# Patient Record
Sex: FEMALE
[De-identification: names, ages, dates, MRNs, and addresses within clinical notes are randomized per-mention and may not be internally consistent; named-entity substitution may affect disease eponyms.]

---

## 2020-04-27 PROBLEM — Z00.00 ENCOUNTER FOR PREVENTIVE HEALTH EXAMINATION: Status: ACTIVE | Noted: 2020-04-27

## 2020-12-17 ENCOUNTER — TRANSCRIPTION ENCOUNTER (OUTPATIENT)
Age: 70
End: 2020-12-17

## 2021-07-12 ENCOUNTER — TRANSCRIPTION ENCOUNTER (OUTPATIENT)
Age: 71
End: 2021-07-12

## 2023-01-12 ENCOUNTER — OFFICE (OUTPATIENT)
Dept: URBAN - METROPOLITAN AREA CLINIC 38 | Facility: CLINIC | Age: 73
Setting detail: OPHTHALMOLOGY
End: 2023-01-12
Payer: MEDICARE

## 2023-01-12 ENCOUNTER — OFFICE (OUTPATIENT)
Dept: URBAN - METROPOLITAN AREA CLINIC 38 | Facility: CLINIC | Age: 73
Setting detail: OPHTHALMOLOGY
End: 2023-01-12

## 2023-01-12 DIAGNOSIS — E11.9: ICD-10-CM

## 2023-01-12 DIAGNOSIS — H33.313: ICD-10-CM

## 2023-01-12 DIAGNOSIS — H43.811: ICD-10-CM

## 2023-01-12 DIAGNOSIS — H90.3: ICD-10-CM

## 2023-01-12 DIAGNOSIS — H02.403: ICD-10-CM

## 2023-01-12 DIAGNOSIS — Z96.1: ICD-10-CM

## 2023-01-12 DIAGNOSIS — H16.223: ICD-10-CM

## 2023-01-12 PROCEDURE — 99213 OFFICE O/P EST LOW 20 MIN: CPT | Performed by: OPHTHALMOLOGY

## 2023-01-12 PROCEDURE — 92551 PURE TONE HEARING TEST AIR: CPT | Performed by: AUDIOLOGIST

## 2023-01-12 ASSESSMENT — REFRACTION_CURRENTRX
OS_SPHERE: +2.00
OS_VPRISM_DIRECTION: SV
OS_CYLINDER: SPH
OS_OVR_VA: 20/
OD_CYLINDER: SPH
OD_VPRISM_DIRECTION: SV
OD_OVR_VA: 20/
OD_SPHERE: +2.00

## 2023-01-12 ASSESSMENT — REFRACTION_AUTOREFRACTION
OD_AXIS: 060
OS_SPHERE: -0.50
OD_SPHERE: -1.00
OS_CYLINDER: -1.50
OS_AXIS: 118
OD_CYLINDER: -0.75

## 2023-01-12 ASSESSMENT — REFRACTION_MANIFEST
OS_AXIS: 050
OU_VA: 20/25
OS_VA1: 20/25
OS_SPHERE: -0.75
OD_SPHERE: -1.50
OD_CYLINDER: -0.50
OD_AXIS: 130
OD_VA1: 20/25-1

## 2023-01-12 ASSESSMENT — KERATOMETRY
OD_K2POWER_DIOPTERS: 46.00
OD_AXISANGLE_DEGREES: 128
METHOD_AUTO_MANUAL: AUTO
OD_K1POWER_DIOPTERS: 45.50
OS_K1POWER_DIOPTERS: 45.50
OS_K2POWER_DIOPTERS: 46.50
OS_AXISANGLE_DEGREES: 054

## 2023-01-12 ASSESSMENT — SPHEQUIV_DERIVED
OD_SPHEQUIV: -1.375
OS_SPHEQUIV: -1.25
OD_SPHEQUIV: -1.75

## 2023-01-12 ASSESSMENT — VISUAL ACUITY
OS_BCVA: 20/30-2
OD_BCVA: 20/20

## 2023-01-12 ASSESSMENT — SUPERFICIAL PUNCTATE KERATITIS (SPK)
OD_SPK: T
OS_SPK: T

## 2023-01-12 ASSESSMENT — TONOMETRY
OD_IOP_MMHG: 13
OS_IOP_MMHG: 13

## 2023-01-12 ASSESSMENT — AXIALLENGTH_DERIVED
OS_AL: 23.1684
OD_AL: 23.3047
OD_AL: 23.4481

## 2023-01-12 ASSESSMENT — CONFRONTATIONAL VISUAL FIELD TEST (CVF)
OS_FINDINGS: FULL
OD_FINDINGS: FULL

## 2024-01-18 ENCOUNTER — OFFICE (OUTPATIENT)
Dept: URBAN - METROPOLITAN AREA CLINIC 38 | Facility: CLINIC | Age: 74
Setting detail: OPHTHALMOLOGY
End: 2024-01-18
Payer: MEDICARE

## 2024-01-18 DIAGNOSIS — E11.9: ICD-10-CM

## 2024-01-18 DIAGNOSIS — H02.403: ICD-10-CM

## 2024-01-18 DIAGNOSIS — H33.313: ICD-10-CM

## 2024-01-18 DIAGNOSIS — Z96.1: ICD-10-CM

## 2024-01-18 DIAGNOSIS — H90.3: ICD-10-CM

## 2024-01-18 DIAGNOSIS — H16.223: ICD-10-CM

## 2024-01-18 DIAGNOSIS — H43.811: ICD-10-CM

## 2024-01-18 DIAGNOSIS — H52.4: ICD-10-CM

## 2024-01-18 DIAGNOSIS — H83.3X3: ICD-10-CM

## 2024-01-18 PROCEDURE — 92567 TYMPANOMETRY: CPT | Mod: AB | Performed by: AUDIOLOGIST

## 2024-01-18 PROCEDURE — 92015 DETERMINE REFRACTIVE STATE: CPT | Performed by: OPHTHALMOLOGY

## 2024-01-18 PROCEDURE — 99213 OFFICE O/P EST LOW 20 MIN: CPT | Performed by: OPHTHALMOLOGY

## 2024-01-18 PROCEDURE — 92557 COMPREHENSIVE HEARING TEST: CPT | Mod: AB | Performed by: AUDIOLOGIST

## 2024-01-18 ASSESSMENT — REFRACTION_MANIFEST
OD_VA2: 20/20(J1+)
OD_SPHERE: -1.00
OS_AXIS: 125
OS_ADD: +2.50
OD_CYLINDER: -0.75
OD_VA1: 20/30
OS_VA1: 20/25-
OS_VA2: 20/20(J1+)
OU_VA: 20/25-2
OS_ADD: +2.50
OD_ADD: +2.50
OD_AXIS: 060
OS_CYLINDER: -1.00
OD_SPHERE: -1.25
OD_CYLINDER: -0.75
OS_VA1: 20/25-
OD_AXIS: 060
OD_ADD: +2.50
OS_AXIS: 125
OD_VA1: 20/30
OS_SPHERE: -0.75
OS_VA2: 20/20(J1+)
OS_SPHERE: -0.50
OS_CYLINDER: -1.00
OD_VA2: 20/20(J1+)
OU_VA: 20/25-2

## 2024-01-18 ASSESSMENT — SPHEQUIV_DERIVED
OD_SPHEQUIV: -1.375
OS_SPHEQUIV: -1
OS_SPHEQUIV: -1.25
OS_SPHEQUIV: -1.375
OD_SPHEQUIV: -1.625
OD_SPHEQUIV: -1.375

## 2024-01-18 ASSESSMENT — SUPERFICIAL PUNCTATE KERATITIS (SPK)
OD_SPK: 1+
OS_SPK: 1+

## 2024-01-18 ASSESSMENT — CONFRONTATIONAL VISUAL FIELD TEST (CVF)
OD_FINDINGS: FULL
OS_FINDINGS: FULL

## 2024-01-18 ASSESSMENT — REFRACTION_AUTOREFRACTION
OS_SPHERE: -0.75
OD_CYLINDER: -0.75
OD_AXIS: 061
OD_SPHERE: -1.00
OS_CYLINDER: -1.25
OS_AXIS: 125

## 2024-01-18 ASSESSMENT — REFRACTION_CURRENTRX
OS_SPHERE: +2.00
OD_OVR_VA: 20/
OD_SPHERE: +2.00
OS_CYLINDER: SPH
OD_CYLINDER: SPH
OD_VPRISM_DIRECTION: SV
OS_OVR_VA: 20/
OS_VPRISM_DIRECTION: SV

## 2024-01-18 ASSESSMENT — TEAR BREAK UP TIME (TBUT)
OS_TBUT: 6-8 SECS
OD_TBUT: 6-8 SECS

## 2024-10-14 ENCOUNTER — APPOINTMENT (OUTPATIENT)
Dept: ORTHOPEDIC SURGERY | Facility: CLINIC | Age: 74
End: 2024-10-14
Payer: MEDICARE

## 2024-10-14 DIAGNOSIS — Z87.81 PERSONAL HISTORY OF (HEALED) TRAUMATIC FRACTURE: ICD-10-CM

## 2024-10-14 DIAGNOSIS — Z79.01 LONG TERM (CURRENT) USE OF ANTICOAGULANTS: ICD-10-CM

## 2024-10-14 DIAGNOSIS — M17.12 UNILATERAL PRIMARY OSTEOARTHRITIS, LEFT KNEE: ICD-10-CM

## 2024-10-14 DIAGNOSIS — Z86.718 PERSONAL HISTORY OF OTHER VENOUS THROMBOSIS AND EMBOLISM: ICD-10-CM

## 2024-10-14 DIAGNOSIS — M24.662 ANKYLOSIS, LEFT KNEE: ICD-10-CM

## 2024-10-14 DIAGNOSIS — T84.84XA PAIN DUE TO INTERNAL ORTHOPEDIC PROSTHETIC DEVICES, IMPLANTS AND GRAFTS, INITIAL ENCOUNTER: ICD-10-CM

## 2024-10-14 PROCEDURE — 99205 OFFICE O/P NEW HI 60 MIN: CPT

## 2024-10-14 PROCEDURE — G2211 COMPLEX E/M VISIT ADD ON: CPT

## 2024-10-14 PROCEDURE — 73564 X-RAY EXAM KNEE 4 OR MORE: CPT | Mod: LT

## 2024-10-14 PROCEDURE — 73552 X-RAY EXAM OF FEMUR 2/>: CPT | Mod: LT

## 2024-11-22 RX ORDER — NAPROXEN 500 MG/1
500 TABLET ORAL
Qty: 60 | Refills: 0 | Status: ACTIVE | COMMUNITY
Start: 2024-11-22 | End: 1900-01-01

## 2024-12-23 ENCOUNTER — RX RENEWAL (OUTPATIENT)
Age: 74
End: 2024-12-23

## 2024-12-30 ENCOUNTER — RX RENEWAL (OUTPATIENT)
Age: 74
End: 2024-12-30

## 2025-02-03 ENCOUNTER — OFFICE (OUTPATIENT)
Dept: URBAN - METROPOLITAN AREA CLINIC 113 | Facility: CLINIC | Age: 75
Setting detail: OPHTHALMOLOGY
End: 2025-02-03
Payer: MEDICARE

## 2025-02-03 DIAGNOSIS — H16.223: ICD-10-CM

## 2025-02-03 DIAGNOSIS — H33.313: ICD-10-CM

## 2025-02-03 DIAGNOSIS — H40.013: ICD-10-CM

## 2025-02-03 DIAGNOSIS — H43.811: ICD-10-CM

## 2025-02-03 DIAGNOSIS — E11.9: ICD-10-CM

## 2025-02-03 DIAGNOSIS — H52.7: ICD-10-CM

## 2025-02-03 DIAGNOSIS — H02.403: ICD-10-CM

## 2025-02-03 PROCEDURE — 92250 FUNDUS PHOTOGRAPHY W/I&R: CPT | Performed by: STUDENT IN AN ORGANIZED HEALTH CARE EDUCATION/TRAINING PROGRAM

## 2025-02-03 PROCEDURE — 92014 COMPRE OPH EXAM EST PT 1/>: CPT | Performed by: STUDENT IN AN ORGANIZED HEALTH CARE EDUCATION/TRAINING PROGRAM

## 2025-02-03 PROCEDURE — 92015 DETERMINE REFRACTIVE STATE: CPT | Performed by: STUDENT IN AN ORGANIZED HEALTH CARE EDUCATION/TRAINING PROGRAM

## 2025-02-03 ASSESSMENT — KERATOMETRY
OD_K2POWER_DIOPTERS: 45.75
METHOD_AUTO_MANUAL: AUTO
OD_AXISANGLE_DEGREES: 138
OS_K1POWER_DIOPTERS: 45.25
OS_K2POWER_DIOPTERS: 46.00
OD_K1POWER_DIOPTERS: 45.50
OS_AXISANGLE_DEGREES: 050

## 2025-02-03 ASSESSMENT — REFRACTION_MANIFEST
OU_VA: 20/25-2
OD_VA1: 20/30
OS_VA2: 20/20(J1+)
OS_VA1: 20/25-
OS_CYLINDER: -1.00
OD_VA1: 20/30
OS_ADD: +2.50
OD_SPHERE: -1.00
OD_ADD: +2.50
OS_VA1: 20/25-
OS_SPHERE: -0.50
OS_CYLINDER: -1.00
OD_AXIS: 060
OS_SPHERE: -0.75
OS_AXIS: 125
OD_CYLINDER: -0.75
OS_ADD: +2.50
OS_VA2: 20/20(J1+)
OD_SPHERE: -1.25
OD_ADD: +2.50
OD_VA2: 20/20(J1+)
OU_VA: 20/25-2
OS_AXIS: 125
OD_VA2: 20/20(J1+)
OD_CYLINDER: -0.75
OD_AXIS: 060

## 2025-02-03 ASSESSMENT — REFRACTION_CURRENTRX
OS_OVR_VA: 20/
OS_SPHERE: +2.00
OD_VPRISM_DIRECTION: SV
OD_SPHERE: +2.00
OS_VPRISM_DIRECTION: SV
OD_OVR_VA: 20/
OS_CYLINDER: SPH
OD_CYLINDER: SPH

## 2025-02-03 ASSESSMENT — SUPERFICIAL PUNCTATE KERATITIS (SPK)
OD_SPK: 1+
OS_SPK: 1+

## 2025-02-03 ASSESSMENT — TEAR BREAK UP TIME (TBUT)
OS_TBUT: 6-8 SECS
OD_TBUT: 6-8 SECS

## 2025-02-03 ASSESSMENT — TONOMETRY
OD_IOP_MMHG: 13
OS_IOP_MMHG: 15

## 2025-02-03 ASSESSMENT — REFRACTION_AUTOREFRACTION
OD_AXIS: 075
OD_CYLINDER: -1.00
OS_AXIS: 125
OS_CYLINDER: -1.00
OD_SPHERE: -1.50
OS_SPHERE: -1.00

## 2025-02-03 ASSESSMENT — CONFRONTATIONAL VISUAL FIELD TEST (CVF)
OS_FINDINGS: FULL
OD_FINDINGS: FULL

## 2025-02-03 ASSESSMENT — VISUAL ACUITY
OS_BCVA: 20/80
OD_BCVA: 20/20-2

## 2025-05-28 ENCOUNTER — OUTPATIENT (OUTPATIENT)
Dept: OUTPATIENT SERVICES | Facility: HOSPITAL | Age: 75
LOS: 1 days | End: 2025-05-28
Payer: MEDICARE

## 2025-05-28 VITALS
OXYGEN SATURATION: 98 % | TEMPERATURE: 97 F | RESPIRATION RATE: 18 BRPM | WEIGHT: 152.12 LBS | DIASTOLIC BLOOD PRESSURE: 70 MMHG | HEART RATE: 67 BPM | SYSTOLIC BLOOD PRESSURE: 114 MMHG | HEIGHT: 60 IN

## 2025-05-28 DIAGNOSIS — Z29.9 ENCOUNTER FOR PROPHYLACTIC MEASURES, UNSPECIFIED: ICD-10-CM

## 2025-05-28 DIAGNOSIS — E10.9 TYPE 1 DIABETES MELLITUS WITHOUT COMPLICATIONS: ICD-10-CM

## 2025-05-28 DIAGNOSIS — Z98.890 OTHER SPECIFIED POSTPROCEDURAL STATES: Chronic | ICD-10-CM

## 2025-05-28 DIAGNOSIS — Z01.818 ENCOUNTER FOR OTHER PREPROCEDURAL EXAMINATION: ICD-10-CM

## 2025-05-28 DIAGNOSIS — M17.12 UNILATERAL PRIMARY OSTEOARTHRITIS, LEFT KNEE: ICD-10-CM

## 2025-05-28 DIAGNOSIS — Z98.49 CATARACT EXTRACTION STATUS, UNSPECIFIED EYE: Chronic | ICD-10-CM

## 2025-05-28 DIAGNOSIS — Z13.89 ENCOUNTER FOR SCREENING FOR OTHER DISORDER: ICD-10-CM

## 2025-05-28 LAB
A1C WITH ESTIMATED AVERAGE GLUCOSE RESULT: 7.6 % — HIGH (ref 4–5.6)
ALBUMIN SERPL ELPH-MCNC: 4.2 G/DL — SIGNIFICANT CHANGE UP (ref 3.3–5.2)
ALP SERPL-CCNC: 93 U/L — SIGNIFICANT CHANGE UP (ref 40–120)
ALT FLD-CCNC: 12 U/L — SIGNIFICANT CHANGE UP
ANION GAP SERPL CALC-SCNC: 14 MMOL/L — SIGNIFICANT CHANGE UP (ref 5–17)
APTT BLD: 30.4 SEC — SIGNIFICANT CHANGE UP (ref 26.1–36.8)
AST SERPL-CCNC: 20 U/L — SIGNIFICANT CHANGE UP
BASOPHILS # BLD AUTO: 0.02 K/UL — SIGNIFICANT CHANGE UP (ref 0–0.2)
BASOPHILS NFR BLD AUTO: 0.3 % — SIGNIFICANT CHANGE UP (ref 0–2)
BILIRUB SERPL-MCNC: 0.3 MG/DL — LOW (ref 0.4–2)
BLD GP AB SCN SERPL QL: SIGNIFICANT CHANGE UP
BUN SERPL-MCNC: 23.3 MG/DL — HIGH (ref 8–20)
CALCIUM SERPL-MCNC: 9.2 MG/DL — SIGNIFICANT CHANGE UP (ref 8.4–10.5)
CHLORIDE SERPL-SCNC: 103 MMOL/L — SIGNIFICANT CHANGE UP (ref 96–108)
CO2 SERPL-SCNC: 24 MMOL/L — SIGNIFICANT CHANGE UP (ref 22–29)
CREAT SERPL-MCNC: 0.45 MG/DL — LOW (ref 0.5–1.3)
EGFR: 101 ML/MIN/1.73M2 — SIGNIFICANT CHANGE UP
EGFR: 101 ML/MIN/1.73M2 — SIGNIFICANT CHANGE UP
EOSINOPHIL # BLD AUTO: 0.23 K/UL — SIGNIFICANT CHANGE UP (ref 0–0.5)
EOSINOPHIL NFR BLD AUTO: 3.8 % — SIGNIFICANT CHANGE UP (ref 0–6)
ESTIMATED AVERAGE GLUCOSE: 171 MG/DL — HIGH (ref 68–114)
GLUCOSE SERPL-MCNC: 102 MG/DL — HIGH (ref 70–99)
HCT VFR BLD CALC: 39.3 % — SIGNIFICANT CHANGE UP (ref 34.5–45)
HGB BLD-MCNC: 12.8 G/DL — SIGNIFICANT CHANGE UP (ref 11.5–15.5)
IMM GRANULOCYTES # BLD AUTO: 0.01 K/UL — SIGNIFICANT CHANGE UP (ref 0–0.07)
IMM GRANULOCYTES NFR BLD AUTO: 0.2 % — SIGNIFICANT CHANGE UP (ref 0–0.9)
INR BLD: 0.94 RATIO — SIGNIFICANT CHANGE UP (ref 0.85–1.16)
LYMPHOCYTES # BLD AUTO: 1.34 K/UL — SIGNIFICANT CHANGE UP (ref 1–3.3)
LYMPHOCYTES NFR BLD AUTO: 22.4 % — SIGNIFICANT CHANGE UP (ref 13–44)
MCHC RBC-ENTMCNC: 31.1 PG — SIGNIFICANT CHANGE UP (ref 27–34)
MCHC RBC-ENTMCNC: 32.6 G/DL — SIGNIFICANT CHANGE UP (ref 32–36)
MCV RBC AUTO: 95.4 FL — SIGNIFICANT CHANGE UP (ref 80–100)
MONOCYTES # BLD AUTO: 0.59 K/UL — SIGNIFICANT CHANGE UP (ref 0–0.9)
MONOCYTES NFR BLD AUTO: 9.8 % — SIGNIFICANT CHANGE UP (ref 2–14)
MRSA PCR RESULT.: SIGNIFICANT CHANGE UP
NEUTROPHILS # BLD AUTO: 3.8 K/UL — SIGNIFICANT CHANGE UP (ref 1.8–7.4)
NEUTROPHILS NFR BLD AUTO: 63.5 % — SIGNIFICANT CHANGE UP (ref 43–77)
NRBC # BLD AUTO: 0 K/UL — SIGNIFICANT CHANGE UP (ref 0–0)
NRBC # FLD: 0 K/UL — SIGNIFICANT CHANGE UP (ref 0–0)
NRBC BLD AUTO-RTO: 0 /100 WBCS — SIGNIFICANT CHANGE UP (ref 0–0)
PLATELET # BLD AUTO: 262 K/UL — SIGNIFICANT CHANGE UP (ref 150–400)
PMV BLD: 9.7 FL — SIGNIFICANT CHANGE UP (ref 7–13)
POTASSIUM SERPL-MCNC: 4.2 MMOL/L — SIGNIFICANT CHANGE UP (ref 3.5–5.3)
POTASSIUM SERPL-SCNC: 4.2 MMOL/L — SIGNIFICANT CHANGE UP (ref 3.5–5.3)
PROT SERPL-MCNC: 6.6 G/DL — SIGNIFICANT CHANGE UP (ref 6.6–8.7)
PROTHROM AB SERPL-ACNC: 10.6 SEC — SIGNIFICANT CHANGE UP (ref 9.9–13.4)
RBC # BLD: 4.12 M/UL — SIGNIFICANT CHANGE UP (ref 3.8–5.2)
RBC # FLD: 12.7 % — SIGNIFICANT CHANGE UP (ref 10.3–14.5)
S AUREUS DNA NOSE QL NAA+PROBE: SIGNIFICANT CHANGE UP
SODIUM SERPL-SCNC: 141 MMOL/L — SIGNIFICANT CHANGE UP (ref 135–145)
WBC # BLD: 5.99 K/UL — SIGNIFICANT CHANGE UP (ref 3.8–10.5)
WBC # FLD AUTO: 5.99 K/UL — SIGNIFICANT CHANGE UP (ref 3.8–10.5)

## 2025-05-28 PROCEDURE — 85025 COMPLETE CBC W/AUTO DIFF WBC: CPT

## 2025-05-28 PROCEDURE — 83036 HEMOGLOBIN GLYCOSYLATED A1C: CPT

## 2025-05-28 PROCEDURE — 86901 BLOOD TYPING SEROLOGIC RH(D): CPT

## 2025-05-28 PROCEDURE — G0463: CPT

## 2025-05-28 PROCEDURE — 85610 PROTHROMBIN TIME: CPT

## 2025-05-28 PROCEDURE — 86850 RBC ANTIBODY SCREEN: CPT

## 2025-05-28 PROCEDURE — 87641 MR-STAPH DNA AMP PROBE: CPT

## 2025-05-28 PROCEDURE — 36415 COLL VENOUS BLD VENIPUNCTURE: CPT

## 2025-05-28 PROCEDURE — 80053 COMPREHEN METABOLIC PANEL: CPT

## 2025-05-28 PROCEDURE — 87640 STAPH A DNA AMP PROBE: CPT

## 2025-05-28 PROCEDURE — 86900 BLOOD TYPING SEROLOGIC ABO: CPT

## 2025-05-28 PROCEDURE — 85730 THROMBOPLASTIN TIME PARTIAL: CPT

## 2025-05-28 NOTE — H&P PST ADULT - MUSCULOSKELETAL
details… no joint swelling/no joint erythema/no joint warmth/no calf tenderness/decreased ROM due to pain/strength 5/5 bilateral upper extremities

## 2025-05-28 NOTE — H&P PST ADULT - HISTORY OF PRESENT ILLNESS
Mariposa is a 74-year-old female that presents today for initial evaluation of left knee pain which is chronic in nature. She is status post multiple surgeries for the left knee dating back to 1972. Have been diagnosed with advanced osteoarthritis, previously indicated for left total knee arthroplasty. She states that she was trying to put this off for as long as possible. Unfortunately, she sustained a fall on 3/5/2024 which did result in both a distal femur shaft and patella fracture which both required ORIF and IM nail placement.  She presents today with a chief complaint of constant, moderate, diffuse dull ache and pain about the knee. Exacerbated with standing, walking and associated with significant stiffness with regard to deep knee bending. She denies any mechanical symptoms or instability. She denies any groin pain.  Treatment has consisted of Tylenol, activity modification, several months of physician directed home exercise program, physical therapy, as well as previous corticosteroid injections, hyaluronic acid injections and activity modification, several months of physician directed home exercise program, physical therapy, as well as previous corticosteroid injections, hyaluronic acid injections and PRP injections. The last injection of any sort was 2 years ago.  Patient reports a history of previous DVT in the left lower extremity following her surgery in March of this year. She is currently taking Eliquis. She does also have a history of insulin-dependent diabetes. 74 yr F PMH Hypothyroidism, HLD,  IDDM ( dexcom pump), DVT(s/p left knee surgery 2024, off eliquis, on aspirin), anemia, GERD, skin ca s/p excision seen in PST today for evaluation. Patient complain of pain and decreased ROM in left knee. Stated she had a fall in 2024 with patella fracture, had ORIF and nail placement. Patient states she has had multiple surgeries to the left knee. The pain has gotten worse, pain is 7/10 with movement, she takes tylenol prn but relief not sustained. Patient is scheduled for complex left knee total replacement on 6/17/25 pending medical optimization.     74 yr F PMH Hypothyroidism, HLD, DM Type 1, IDDM ( dexcom pump), DVT(s/p left knee surgery 2024, off eliquis, on aspirin), anemia, GERD, skin ca s/p excision seen in PST today for evaluation. Patient complain of pain and decreased ROM in left knee. Stated she had a fall in 2024 with patella fracture, had ORIF and nail placement. Patient states she has had multiple surgeries to the left knee. The pain has gotten worse, pain is 7/10 with movement, she takes tylenol prn but relief not sustained. Patient is scheduled for complex left knee total replacement on 6/17/25 pending medical optimization.     74 yr F PMH Hypothyroidism, HLD, DM Type 1, IDDM ( dexcom pump), DVT(s/p left knee surgery 2024, off eliquis, on aspirin), anemia, GERD, skin ca s/p excision seen in PST today for evaluation. Patient complain of pain and decreased ROM in left knee. Stated she had a fall in 2024 with patella fracture, had ORIF and nail placement. Patient states she has had multiple surgeries to the left knee. The pain has gotten worse, pain is 7/10 with movement, she takes tylenol prn but relief not sustained. Patient is scheduled for complex left knee total replacement on 6/17/25 pending medical and cardiac optimizations

## 2025-05-28 NOTE — H&P PST ADULT - NSICDXPASTSURGICALHX_GEN_ALL_CORE_FT
PAST SURGICAL HISTORY:  H/O knee surgery     History of carpal tunnel surgery     S/P cataract surgery     S/P decompression of ulnar nerve

## 2025-05-28 NOTE — H&P PST ADULT - PROBLEM SELECTOR PLAN 2
Novolog, dexcom pump, f/u with endocrinology Patient is scheduled for complex left knee total replacement on 6/17/25 pending medical and cardiac optimizations

## 2025-05-28 NOTE — H&P PST ADULT - PROBLEM SELECTOR PLAN 1
Patient is scheduled for complex left knee total replacement on 6/17/25 pending medical optimization. preop assessment, 12 lead EKG reviewed, cardiac optimization pending

## 2025-05-28 NOTE — H&P PST ADULT - NSICDXFAMILYHX_GEN_ALL_CORE_FT
FAMILY HISTORY:  Father  Still living? Unknown  FHx: lung cancer, Age at diagnosis: Age Unknown    Mother  Still living? Unknown  FH: HTN (hypertension), Age at diagnosis: Age Unknown  FH: skin cancer, Age at diagnosis: Age Unknown  FH: type 2 diabetes, Age at diagnosis: Age Unknown  FHx: lung cancer, Age at diagnosis: Age Unknown    Sibling  Still living? Unknown  FH: prostate cancer, Age at diagnosis: Age Unknown

## 2025-05-28 NOTE — H&P PST ADULT - PROBLEM SELECTOR PLAN 3
caprini score 8, high risk for dvt, SCD ordered, surgical team to assess for dvt prophylaxis Novolog, dexcom pump, f/u with endocrinology

## 2025-05-28 NOTE — H&P PST ADULT - NSICDXPASTMEDICALHX_GEN_ALL_CORE_FT
PAST MEDICAL HISTORY:  T1DM (type 1 diabetes mellitus)      PAST MEDICAL HISTORY:  Anemia     GERD (gastroesophageal reflux disease)     History of DVT of lower extremity     Hypothyroidism     Osteoarthritis     T1DM (type 1 diabetes mellitus)      PAST MEDICAL HISTORY:  Anemia     GERD (gastroesophageal reflux disease)     History of DVT of lower extremity     History of fractured pelvis     History of stress fracture     Hypothyroidism     Osteoarthritis     T1DM (type 1 diabetes mellitus)      PAST MEDICAL HISTORY:  Abnormal EKG     Anemia     GERD (gastroesophageal reflux disease)     History of DVT of lower extremity     History of fractured pelvis     History of stress fracture     Hypothyroidism     Osteoarthritis     T1DM (type 1 diabetes mellitus)

## 2025-05-28 NOTE — H&P PST ADULT - ASSESSMENT
74 yr F PMH Hypothyroidism, HLD,  IDDM ( dexcom pump), DVT(s/p left knee surgery , off eliquis, on aspirin), anemia, GERD, skin ca s/p excision seen in PST today for evaluation. Patient complain of pain and decreased ROM in left knee. Stated she had a fall in  with patella fracture, had ORIF and nail placement. Patient states she has had multiple surgeries to the left knee. The pain has gotten worse, pain is 7/10 with movement, she takes tylenol prn but relief not sustained. Patient is scheduled for complex left knee total replacement on 25 pending medical optimization.    Patient given written and oral ERP instructions,  verbalized understanding.  Patient's medication list reviewed and dosages/names of meds reconciled with Dr Wilder according to all information available at the time of PST visit.     Patient was educated on preop preparation with written and verbal instructions. Pt was informed to obtain medical optimization >3 days before surgery. Pt will review insulin pump management with endocrinology . Pt was educated on NSAIDs, multivitamins and herbals that increase the risk of bleeding and need to be stopped 7 days before procedure. Pt verbalized understanding of the above.      OPIOID RISK TOOL    CLAUDIA EACH BOX THAT APPLIES AND ADD TOTALS AT THE END    FAMILY HISTORY OF SUBSTANCE ABUSE                 FEMALE         MALE                                                Alcohol                             [  ]1 pt          [  ]3pts                                               Illegal Drugs                     [  ]2 pts        [  ]3pts                                               Rx Drugs                           [  ]4 pts        [  ]4 pts    PERSONAL HISTORY OF SUBSTANCE ABUSE                                                                                          Alcohol                             [  ]3 pts       [  ]3 pts                                               Illegal Drugs                     [  ]4 pts        [  ]4 pts                                               Rx Drugs                           [  ]5 pts        [  ]5 pts    AGE BETWEEN 16-45 YEARS                                      [  ]1 pt         [  ]1 pt    HISTORY OF PREADOLESCENT   SEXUAL ABUSE                                                             [  ]3 pts        [  ]0pts    PSYCHOLOGICAL DISEASE                     ADD, OCD, Bipolar, Schizophrenia        [  ]2 pts         [  ]2 pts                      Depression                                               [  ]1 pt           [  ]1 pt           SCORING TOTAL   (add numbers and type here)              (**0*)                                     A score of 3 or lower indicated LOW risk for future opioid abuse  A score of 4 to 7 indicated moderate risk for future opioid abuse  A score of 8 or higher indicates a high risk for opioid abuse    CAPRINI SCORE    AGE RELATED RISK FACTORS                                                             [ ] Age 41-60 years                                            (1 Point)  [ x] Age: 61-74 years                                           (2 Points)                 [ ] Age= 75 years                                                (3 Points)             DISEASE RELATED RISK FACTORS                                                       [ ] Edema in the lower extremities                 (1 Point)                     [ ] Varicose veins                                               (1 Point)                                 [ x] BMI > 25 Kg/m2                                            (1 Point)                                  [ ] Serious infection (ie PNA)                            (1 Point)                     [ ] Lung disease ( COPD, Emphysema)            (1 Point)                                                                          [ ] Acute myocardial infarction                         (1 Point)                  [ ] Congestive heart failure (in the previous month)  (1 Point)         [ ] Inflammatory bowel disease                            (1 Point)                  [ ] Central venous access, PICC or Port               (2 points)       (within the last month)                                                                [ ] Stroke (in the previous month)                        (5 Points)    [ ] Previous or present malignancy                       (2 points)                                                                                                                                                         HEMATOLOGY RELATED FACTORS                                                         [ ] Prior episodes of VTE                                     (3 Points)                     [ ] Positive family history for VTE                      (3 Points)                  [ ] Prothrombin 80238 A                                     (3 Points)                     [ ] Factor V Leiden                                                (3 Points)                        [ ] Lupus anticoagulants                                      (3 Points)                                                           [ ] Anticardiolipin antibodies                              (3 Points)                                                       [ ] High homocysteine in the blood                   (3 Points)                                             [ ] Other congenital or acquired thrombophilia      (3 Points)                                                [ ] Heparin induced thrombocytopenia                  (3 Points)                                        MOBILITY RELATED FACTORS  [ ] Bed rest                                                         (1 Point)  [ ] Plaster cast                                                    (2 points)  [ ] Bed bound for more than 72 hours           (2 Points)    GENDER SPECIFIC FACTORS  [ ] Pregnancy or had a baby within the last month   (1 Point)  [ ] Post-partum < 6 weeks                                   (1 Point)  [ ] Hormonal therapy  or oral contraception   (1 Point)  [ ] History of pregnancy complications              (1 point)  [ ] Unexplained or recurrent              (1 Point)    OTHER RISK FACTORS                                           (1 Point)  [ ] BMI >40, smoking, diabetes requiring insulin, chemotherapy  blood transfusions and length of surgery over 2 hours    SURGERY RELATED RISK FACTORS  [ ]  Section within the last month     (1 Point)  [ ] Minor surgery                                                  (1 Point)  [ ] Arthroscopic surgery                                       (2 Points)  [ ] Planned major surgery lasting more            (2 Points)      than 45 minutes     [ x] Elective hip or knee joint replacement       (5 points)       surgery                                                TRAUMA RELATED RISK FACTORS  [ ] Fracture of the hip, pelvis, or leg                       (5 Points)  [ ] Spinal cord injury resulting in paralysis             (5 points)       (in the previous month)    [ ] Paralysis  (less than 1 month)                             (5 Points)  [ ] Multiple Trauma within 1 month                        (5 Points)    Total Score [    8    ]    Caprini Score 0-2: Low Risk, NO VTE prophylaxis required for most patients, encourage ambulation  Caprini Score 3-6: Moderate Risk , pharmacologic VTE prophylaxis is indicated for most patients (in the absence of contraindications)  Caprini Score Greater than or =7: High risk, pharmocologic VTE prophylaxis indicated for most patients (in the absence of contraindications)                                     74 yr F PMH Hypothyroidism, HLD, DM Type 1, IDDM ( dexcom pump), DVT(s/p left knee surgery , off eliquis, on aspirin), anemia, GERD, skin ca s/p excision seen in PST today for evaluation. Patient complain of pain and decreased ROM in left knee. Stated she had a fall in  with patella fracture, had ORIF and nail placement. Patient states she has had multiple surgeries to the left knee. The pain has gotten worse, pain is 7/10 with movement, she takes tylenol prn but relief not sustained. Patient is scheduled for complex left knee total replacement on 25 pending medical optimization.    Patient given written and oral ERP instructions,  verbalized understanding.  Patient's medication list reviewed and dosages/names of meds reconciled with Dr Wilder according to all information available at the time of PST visit.     Patient was educated on preop preparation with written and verbal instructions. Pt was informed to obtain medical optimization >3 days before surgery. Pt will review insulin pump management with endocrinology . Pt was educated on NSAIDs, multivitamins and herbals that increase the risk of bleeding and need to be stopped 7 days before procedure. Pt verbalized understanding of the above.      OPIOID RISK TOOL    CLAUDIA EACH BOX THAT APPLIES AND ADD TOTALS AT THE END    FAMILY HISTORY OF SUBSTANCE ABUSE                 FEMALE         MALE                                                Alcohol                             [  ]1 pt          [  ]3pts                                               Illegal Drugs                     [  ]2 pts        [  ]3pts                                               Rx Drugs                           [  ]4 pts        [  ]4 pts    PERSONAL HISTORY OF SUBSTANCE ABUSE                                                                                          Alcohol                             [  ]3 pts       [  ]3 pts                                               Illegal Drugs                     [  ]4 pts        [  ]4 pts                                               Rx Drugs                           [  ]5 pts        [  ]5 pts    AGE BETWEEN 16-45 YEARS                                      [  ]1 pt         [  ]1 pt    HISTORY OF PREADOLESCENT   SEXUAL ABUSE                                                             [  ]3 pts        [  ]0pts    PSYCHOLOGICAL DISEASE                     ADD, OCD, Bipolar, Schizophrenia        [  ]2 pts         [  ]2 pts                      Depression                                               [  ]1 pt           [  ]1 pt           SCORING TOTAL   (add numbers and type here)              (**0*)                                     A score of 3 or lower indicated LOW risk for future opioid abuse  A score of 4 to 7 indicated moderate risk for future opioid abuse  A score of 8 or higher indicates a high risk for opioid abuse    CAPRINI SCORE    AGE RELATED RISK FACTORS                                                             [ ] Age 41-60 years                                            (1 Point)  [ x] Age: 61-74 years                                           (2 Points)                 [ ] Age= 75 years                                                (3 Points)             DISEASE RELATED RISK FACTORS                                                       [ ] Edema in the lower extremities                 (1 Point)                     [ ] Varicose veins                                               (1 Point)                                 [ x] BMI > 25 Kg/m2                                            (1 Point)                                  [ ] Serious infection (ie PNA)                            (1 Point)                     [ ] Lung disease ( COPD, Emphysema)            (1 Point)                                                                          [ ] Acute myocardial infarction                         (1 Point)                  [ ] Congestive heart failure (in the previous month)  (1 Point)         [ ] Inflammatory bowel disease                            (1 Point)                  [ ] Central venous access, PICC or Port               (2 points)       (within the last month)                                                                [ ] Stroke (in the previous month)                        (5 Points)    [ ] Previous or present malignancy                       (2 points)                                                                                                                                                         HEMATOLOGY RELATED FACTORS                                                         [ ] Prior episodes of VTE                                     (3 Points)                     [ ] Positive family history for VTE                      (3 Points)                  [ ] Prothrombin 49957 A                                     (3 Points)                     [ ] Factor V Leiden                                                (3 Points)                        [ ] Lupus anticoagulants                                      (3 Points)                                                           [ ] Anticardiolipin antibodies                              (3 Points)                                                       [ ] High homocysteine in the blood                   (3 Points)                                             [ ] Other congenital or acquired thrombophilia      (3 Points)                                                [ ] Heparin induced thrombocytopenia                  (3 Points)                                        MOBILITY RELATED FACTORS  [ ] Bed rest                                                         (1 Point)  [ ] Plaster cast                                                    (2 points)  [ ] Bed bound for more than 72 hours           (2 Points)    GENDER SPECIFIC FACTORS  [ ] Pregnancy or had a baby within the last month   (1 Point)  [ ] Post-partum < 6 weeks                                   (1 Point)  [ ] Hormonal therapy  or oral contraception   (1 Point)  [ ] History of pregnancy complications              (1 point)  [ ] Unexplained or recurrent              (1 Point)    OTHER RISK FACTORS                                           (1 Point)  [ ] BMI >40, smoking, diabetes requiring insulin, chemotherapy  blood transfusions and length of surgery over 2 hours    SURGERY RELATED RISK FACTORS  [ ]  Section within the last month     (1 Point)  [ ] Minor surgery                                                  (1 Point)  [ ] Arthroscopic surgery                                       (2 Points)  [ ] Planned major surgery lasting more            (2 Points)      than 45 minutes     [ x] Elective hip or knee joint replacement       (5 points)       surgery                                                TRAUMA RELATED RISK FACTORS  [ ] Fracture of the hip, pelvis, or leg                       (5 Points)  [ ] Spinal cord injury resulting in paralysis             (5 points)       (in the previous month)    [ ] Paralysis  (less than 1 month)                             (5 Points)  [ ] Multiple Trauma within 1 month                        (5 Points)    Total Score [    8    ]    Caprini Score 0-2: Low Risk, NO VTE prophylaxis required for most patients, encourage ambulation  Caprini Score 3-6: Moderate Risk , pharmacologic VTE prophylaxis is indicated for most patients (in the absence of contraindications)  Caprini Score Greater than or =7: High risk, pharmocologic VTE prophylaxis indicated for most patients (in the absence of contraindications)                                     74 yr F PMH Hypothyroidism, HLD, DM Type 1, IDDM ( dexcom pump), DVT(s/p left knee surgery , off eliquis, on aspirin), anemia, GERD, skin ca s/p excision seen in PST today for evaluation. Patient complain of pain and decreased ROM in left knee. Stated she had a fall in  with patella fracture, had ORIF and nail placement. Patient states she has had multiple surgeries to the left knee. The pain has gotten worse, pain is 7/10 with movement, she takes tylenol prn but relief not sustained. Patient is scheduled for complex left knee total replacement on 25 pending medical and cardiac optimizations    Patient given written and oral ERP instructions,  verbalized understanding.  Patient's medication list reviewed and dosages/names of meds reconciled with Dr Wilder according to all information available at the time of PST visit.     Patient was educated on preop preparation with written and verbal instructions. Pt was informed to obtain medical optimization >3 days before surgery. Pt will review insulin pump management with endocrinology . Pt was educated on NSAIDs, multivitamins and herbals that increase the risk of bleeding and need to be stopped 7 days before procedure. Pt verbalized understanding of the above.      OPIOID RISK TOOL    CLAUDIA EACH BOX THAT APPLIES AND ADD TOTALS AT THE END    FAMILY HISTORY OF SUBSTANCE ABUSE                 FEMALE         MALE                                                Alcohol                             [  ]1 pt          [  ]3pts                                               Illegal Drugs                     [  ]2 pts        [  ]3pts                                               Rx Drugs                           [  ]4 pts        [  ]4 pts    PERSONAL HISTORY OF SUBSTANCE ABUSE                                                                                          Alcohol                             [  ]3 pts       [  ]3 pts                                               Illegal Drugs                     [  ]4 pts        [  ]4 pts                                               Rx Drugs                           [  ]5 pts        [  ]5 pts    AGE BETWEEN 16-45 YEARS                                      [  ]1 pt         [  ]1 pt    HISTORY OF PREADOLESCENT   SEXUAL ABUSE                                                             [  ]3 pts        [  ]0pts    PSYCHOLOGICAL DISEASE                     ADD, OCD, Bipolar, Schizophrenia        [  ]2 pts         [  ]2 pts                      Depression                                               [  ]1 pt           [  ]1 pt           SCORING TOTAL   (add numbers and type here)              (**0*)                                     A score of 3 or lower indicated LOW risk for future opioid abuse  A score of 4 to 7 indicated moderate risk for future opioid abuse  A score of 8 or higher indicates a high risk for opioid abuse    CAPRINI SCORE    AGE RELATED RISK FACTORS                                                             [ ] Age 41-60 years                                            (1 Point)  [ x] Age: 61-74 years                                           (2 Points)                 [ ] Age= 75 years                                                (3 Points)             DISEASE RELATED RISK FACTORS                                                       [ ] Edema in the lower extremities                 (1 Point)                     [ ] Varicose veins                                               (1 Point)                                 [ x] BMI > 25 Kg/m2                                            (1 Point)                                  [ ] Serious infection (ie PNA)                            (1 Point)                     [ ] Lung disease ( COPD, Emphysema)            (1 Point)                                                                          [ ] Acute myocardial infarction                         (1 Point)                  [ ] Congestive heart failure (in the previous month)  (1 Point)         [ ] Inflammatory bowel disease                            (1 Point)                  [ ] Central venous access, PICC or Port               (2 points)       (within the last month)                                                                [ ] Stroke (in the previous month)                        (5 Points)    [ ] Previous or present malignancy                       (2 points)                                                                                                                                                         HEMATOLOGY RELATED FACTORS                                                         [ ] Prior episodes of VTE                                     (3 Points)                     [ ] Positive family history for VTE                      (3 Points)                  [ ] Prothrombin 77484 A                                     (3 Points)                     [ ] Factor V Leiden                                                (3 Points)                        [ ] Lupus anticoagulants                                      (3 Points)                                                           [ ] Anticardiolipin antibodies                              (3 Points)                                                       [ ] High homocysteine in the blood                   (3 Points)                                             [ ] Other congenital or acquired thrombophilia      (3 Points)                                                [ ] Heparin induced thrombocytopenia                  (3 Points)                                        MOBILITY RELATED FACTORS  [ ] Bed rest                                                         (1 Point)  [ ] Plaster cast                                                    (2 points)  [ ] Bed bound for more than 72 hours           (2 Points)    GENDER SPECIFIC FACTORS  [ ] Pregnancy or had a baby within the last month   (1 Point)  [ ] Post-partum < 6 weeks                                   (1 Point)  [ ] Hormonal therapy  or oral contraception   (1 Point)  [ ] History of pregnancy complications              (1 point)  [ ] Unexplained or recurrent              (1 Point)    OTHER RISK FACTORS                                           (1 Point)  [ ] BMI >40, smoking, diabetes requiring insulin, chemotherapy  blood transfusions and length of surgery over 2 hours    SURGERY RELATED RISK FACTORS  [ ]  Section within the last month     (1 Point)  [ ] Minor surgery                                                  (1 Point)  [ ] Arthroscopic surgery                                       (2 Points)  [ ] Planned major surgery lasting more            (2 Points)      than 45 minutes     [ x] Elective hip or knee joint replacement       (5 points)       surgery                                                TRAUMA RELATED RISK FACTORS  [ ] Fracture of the hip, pelvis, or leg                       (5 Points)  [ ] Spinal cord injury resulting in paralysis             (5 points)       (in the previous month)    [ ] Paralysis  (less than 1 month)                             (5 Points)  [ ] Multiple Trauma within 1 month                        (5 Points)    Total Score [    8    ]    Caprini Score 0-2: Low Risk, NO VTE prophylaxis required for most patients, encourage ambulation  Caprini Score 3-6: Moderate Risk , pharmacologic VTE prophylaxis is indicated for most patients (in the absence of contraindications)  Caprini Score Greater than or =7: High risk, pharmocologic VTE prophylaxis indicated for most patients (in the absence of contraindications)

## 2025-05-29 LAB — ABO RH CONFIRMATION: SIGNIFICANT CHANGE UP

## 2025-05-30 DIAGNOSIS — R94.31 ABNORMAL ELECTROCARDIOGRAM [ECG] [EKG]: ICD-10-CM

## 2025-06-02 ENCOUNTER — APPOINTMENT (OUTPATIENT)
Dept: ORTHOPEDIC SURGERY | Facility: CLINIC | Age: 75
End: 2025-06-02
Payer: MEDICARE

## 2025-06-02 VITALS
SYSTOLIC BLOOD PRESSURE: 126 MMHG | HEIGHT: 63 IN | WEIGHT: 147 LBS | HEART RATE: 62 BPM | DIASTOLIC BLOOD PRESSURE: 72 MMHG | BODY MASS INDEX: 26.05 KG/M2

## 2025-06-02 DIAGNOSIS — Z87.81 PERSONAL HISTORY OF (HEALED) TRAUMATIC FRACTURE: ICD-10-CM

## 2025-06-02 DIAGNOSIS — M17.12 UNILATERAL PRIMARY OSTEOARTHRITIS, LEFT KNEE: ICD-10-CM

## 2025-06-02 DIAGNOSIS — Z79.01 LONG TERM (CURRENT) USE OF ANTICOAGULANTS: ICD-10-CM

## 2025-06-02 DIAGNOSIS — Z86.718 PERSONAL HISTORY OF OTHER VENOUS THROMBOSIS AND EMBOLISM: ICD-10-CM

## 2025-06-02 PROCEDURE — 99214 OFFICE O/P EST MOD 30 MIN: CPT

## 2025-06-02 PROCEDURE — G2211 COMPLEX E/M VISIT ADD ON: CPT

## 2025-06-07 ENCOUNTER — NON-APPOINTMENT (OUTPATIENT)
Age: 75
End: 2025-06-07

## 2025-06-16 RX ORDER — POVIDONE-IODINE 7.5 %
1 SOLUTION, NON-ORAL TOPICAL ONCE
Refills: 0 | Status: COMPLETED | OUTPATIENT
Start: 2025-06-17 | End: 2025-06-17

## 2025-06-16 NOTE — PATIENT PROFILE ADULT - FALL HARM RISK - HARM RISK INTERVENTIONS
Assistance with ambulation/Assistance OOB with selected safe patient handling equipment/Communicate Risk of Fall with Harm to all staff/Discuss with provider need for PT consult/Monitor gait and stability/Provide patient with walking aids - walker, cane, crutches/Reinforce activity limits and safety measures with patient and family/Sit up slowly, dangle for a short time, stand at bedside before walking/Tailored Fall Risk Interventions/Use of alarms - bed, chair and/or voice tab/Visual Cue: Yellow wristband and red socks/Bed in lowest position, wheels locked, appropriate side rails in place/Call bell, personal items and telephone in reach/Instruct patient to call for assistance before getting out of bed or chair/Non-slip footwear when patient is out of bed/[place] to call system/Physically safe environment - no spills, clutter or unnecessary equipment/Purposeful Proactive Rounding/Room/bathroom lighting operational, light cord in reach

## 2025-06-17 ENCOUNTER — INPATIENT (INPATIENT)
Facility: HOSPITAL | Age: 75
LOS: 0 days | Discharge: HOME HEALTH | DRG: 470 | End: 2025-06-18
Attending: ORTHOPAEDIC SURGERY | Admitting: ORTHOPAEDIC SURGERY
Payer: MEDICARE

## 2025-06-17 ENCOUNTER — TRANSCRIPTION ENCOUNTER (OUTPATIENT)
Age: 75
End: 2025-06-17

## 2025-06-17 ENCOUNTER — APPOINTMENT (OUTPATIENT)
Dept: ORTHOPEDIC SURGERY | Facility: HOSPITAL | Age: 75
End: 2025-06-17

## 2025-06-17 VITALS
WEIGHT: 152.12 LBS | OXYGEN SATURATION: 99 % | HEART RATE: 62 BPM | RESPIRATION RATE: 16 BRPM | HEIGHT: 63 IN | DIASTOLIC BLOOD PRESSURE: 65 MMHG | SYSTOLIC BLOOD PRESSURE: 125 MMHG | TEMPERATURE: 98 F

## 2025-06-17 DIAGNOSIS — M17.12 UNILATERAL PRIMARY OSTEOARTHRITIS, LEFT KNEE: ICD-10-CM

## 2025-06-17 DIAGNOSIS — Z98.890 OTHER SPECIFIED POSTPROCEDURAL STATES: Chronic | ICD-10-CM

## 2025-06-17 DIAGNOSIS — Z98.49 CATARACT EXTRACTION STATUS, UNSPECIFIED EYE: Chronic | ICD-10-CM

## 2025-06-17 LAB
GLUCOSE BLDC GLUCOMTR-MCNC: 158 MG/DL — HIGH (ref 70–99)
GLUCOSE BLDC GLUCOMTR-MCNC: 171 MG/DL — HIGH (ref 70–99)
GLUCOSE BLDC GLUCOMTR-MCNC: 186 MG/DL — HIGH (ref 70–99)
GLUCOSE BLDC GLUCOMTR-MCNC: 192 MG/DL — HIGH (ref 70–99)
GLUCOSE BLDC GLUCOMTR-MCNC: 215 MG/DL — HIGH (ref 70–99)

## 2025-06-17 PROCEDURE — 27447 TOTAL KNEE ARTHROPLASTY: CPT | Mod: LT,22

## 2025-06-17 PROCEDURE — 99222 1ST HOSP IP/OBS MODERATE 55: CPT

## 2025-06-17 PROCEDURE — 20985 CPTR-ASST DIR MS PX: CPT

## 2025-06-17 PROCEDURE — 20680 REMOVAL OF IMPLANT DEEP: CPT | Mod: 59,LT

## 2025-06-17 PROCEDURE — 27447 TOTAL KNEE ARTHROPLASTY: CPT | Mod: AS,LT,22

## 2025-06-17 PROCEDURE — 73560 X-RAY EXAM OF KNEE 1 OR 2: CPT | Mod: 26,LT

## 2025-06-17 DEVICE — INSERT TIB NONPOROUS UNIV SZ 6 LT: Type: IMPLANTABLE DEVICE | Site: LEFT | Status: FUNCTIONAL

## 2025-06-17 DEVICE — GUIDE PIN SCREW ORTHO STR FLUTED: Type: IMPLANTABLE DEVICE | Site: LEFT | Status: FUNCTIONAL

## 2025-06-17 DEVICE — STEM MOD UNIV TIB 12X40MM: Type: IMPLANTABLE DEVICE | Site: LEFT | Status: FUNCTIONAL

## 2025-06-17 DEVICE — INSERT TIB EMPOWER 3D LT SZ 6 12MM: Type: IMPLANTABLE DEVICE | Site: LEFT | Status: FUNCTIONAL

## 2025-06-17 DEVICE — COMP FEM NON POROUS SZ 6 LT: Type: IMPLANTABLE DEVICE | Site: LEFT | Status: FUNCTIONAL

## 2025-06-17 DEVICE — GUIDE PIN/SCREW ORTHO 3.2 X 37MM: Type: IMPLANTABLE DEVICE | Site: LEFT | Status: FUNCTIONAL

## 2025-06-17 DEVICE — GUIDE PIN SCREW ORTHO THRD SQ HEADED: Type: IMPLANTABLE DEVICE | Site: LEFT | Status: FUNCTIONAL

## 2025-06-17 DEVICE — PIN ARVIS ALIGNMENT AR SURGICAL GUIDANCE: Type: IMPLANTABLE DEVICE | Site: LEFT | Status: FUNCTIONAL

## 2025-06-17 DEVICE — CEMENT PALACOS R: Type: IMPLANTABLE DEVICE | Site: LEFT | Status: FUNCTIONAL

## 2025-06-17 RX ORDER — ONDANSETRON HCL/PF 4 MG/2 ML
4 VIAL (ML) INJECTION ONCE
Refills: 0 | Status: DISCONTINUED | OUTPATIENT
Start: 2025-06-17 | End: 2025-06-17

## 2025-06-17 RX ORDER — CEFAZOLIN SODIUM IN 0.9 % NACL 3 G/100 ML
2000 INTRAVENOUS SOLUTION, PIGGYBACK (ML) INTRAVENOUS ONCE
Refills: 0 | Status: DISCONTINUED | OUTPATIENT
Start: 2025-06-17 | End: 2025-06-17

## 2025-06-17 RX ORDER — CELECOXIB 50 MG/1
200 CAPSULE ORAL EVERY 12 HOURS
Refills: 0 | Status: DISCONTINUED | OUTPATIENT
Start: 2025-06-19 | End: 2025-06-18

## 2025-06-17 RX ORDER — CEFAZOLIN SODIUM IN 0.9 % NACL 3 G/100 ML
2000 INTRAVENOUS SOLUTION, PIGGYBACK (ML) INTRAVENOUS
Refills: 0 | Status: DISCONTINUED | OUTPATIENT
Start: 2025-06-17 | End: 2025-06-18

## 2025-06-17 RX ORDER — SENNA 187 MG
2 TABLET ORAL AT BEDTIME
Refills: 0 | Status: DISCONTINUED | OUTPATIENT
Start: 2025-06-17 | End: 2025-06-18

## 2025-06-17 RX ORDER — INSULIN ASPART 100 [IU]/ML
0 INJECTION, SOLUTION INTRAVENOUS; SUBCUTANEOUS
Refills: 0 | DISCHARGE

## 2025-06-17 RX ORDER — ONDANSETRON HCL/PF 4 MG/2 ML
4 VIAL (ML) INJECTION EVERY 6 HOURS
Refills: 0 | Status: DISCONTINUED | OUTPATIENT
Start: 2025-06-17 | End: 2025-06-18

## 2025-06-17 RX ORDER — ACETAMINOPHEN 500 MG/5ML
975 LIQUID (ML) ORAL EVERY 8 HOURS
Refills: 0 | Status: DISCONTINUED | OUTPATIENT
Start: 2025-06-17 | End: 2025-06-18

## 2025-06-17 RX ORDER — HYDROMORPHONE/SOD CHLOR,ISO/PF 2 MG/10 ML
0.5 SYRINGE (ML) INJECTION
Refills: 0 | Status: DISCONTINUED | OUTPATIENT
Start: 2025-06-17 | End: 2025-06-17

## 2025-06-17 RX ORDER — FENTANYL CITRATE-0.9 % NACL/PF 100MCG/2ML
50 SYRINGE (ML) INTRAVENOUS
Refills: 0 | Status: DISCONTINUED | OUTPATIENT
Start: 2025-06-17 | End: 2025-06-17

## 2025-06-17 RX ORDER — OXYCODONE HYDROCHLORIDE 30 MG/1
10 TABLET ORAL
Refills: 0 | Status: DISCONTINUED | OUTPATIENT
Start: 2025-06-17 | End: 2025-06-18

## 2025-06-17 RX ORDER — INSULIN ASPART 100 [IU]/ML
1 INJECTION, SOLUTION INTRAVENOUS; SUBCUTANEOUS
Refills: 0 | Status: DISCONTINUED | OUTPATIENT
Start: 2025-06-17 | End: 2025-06-18

## 2025-06-17 RX ORDER — SODIUM CHLORIDE 9 G/1000ML
1000 INJECTION, SOLUTION INTRAVENOUS
Refills: 0 | Status: DISCONTINUED | OUTPATIENT
Start: 2025-06-17 | End: 2025-06-17

## 2025-06-17 RX ORDER — LEVOTHYROXINE SODIUM 300 MCG
88 TABLET ORAL DAILY
Refills: 0 | Status: DISCONTINUED | OUTPATIENT
Start: 2025-06-18 | End: 2025-06-18

## 2025-06-17 RX ORDER — ACETAMINOPHEN 500 MG/5ML
1000 LIQUID (ML) ORAL ONCE
Refills: 0 | Status: COMPLETED | OUTPATIENT
Start: 2025-06-17 | End: 2025-06-18

## 2025-06-17 RX ORDER — SODIUM CHLORIDE 9 G/1000ML
1000 INJECTION, SOLUTION INTRAVENOUS
Refills: 0 | Status: DISCONTINUED | OUTPATIENT
Start: 2025-06-17 | End: 2025-06-18

## 2025-06-17 RX ORDER — DEXTROSE 50 % IN WATER 50 %
25 SYRINGE (ML) INTRAVENOUS ONCE
Refills: 0 | Status: DISCONTINUED | OUTPATIENT
Start: 2025-06-17 | End: 2025-06-18

## 2025-06-17 RX ORDER — CEFAZOLIN SODIUM IN 0.9 % NACL 3 G/100 ML
2000 INTRAVENOUS SOLUTION, PIGGYBACK (ML) INTRAVENOUS
Refills: 0 | Status: DISCONTINUED | OUTPATIENT
Start: 2025-06-17 | End: 2025-06-17

## 2025-06-17 RX ORDER — MAGNESIUM HYDROXIDE 400 MG/5ML
30 SUSPENSION ORAL DAILY
Refills: 0 | Status: DISCONTINUED | OUTPATIENT
Start: 2025-06-17 | End: 2025-06-18

## 2025-06-17 RX ORDER — B1/B2/B3/B5/B6/B12/VIT C/FOLIC 500-0.5 MG
1 TABLET ORAL DAILY
Refills: 0 | Status: DISCONTINUED | OUTPATIENT
Start: 2025-06-17 | End: 2025-06-18

## 2025-06-17 RX ORDER — TRAMADOL HYDROCHLORIDE 50 MG/1
50 TABLET, FILM COATED ORAL EVERY 4 HOURS
Refills: 0 | Status: DISCONTINUED | OUTPATIENT
Start: 2025-06-17 | End: 2025-06-18

## 2025-06-17 RX ORDER — APREPITANT 40 MG/1
40 CAPSULE ORAL ONCE
Refills: 0 | Status: COMPLETED | OUTPATIENT
Start: 2025-06-17 | End: 2025-06-17

## 2025-06-17 RX ORDER — INSULIN ASPART 100 [IU]/ML
1 INJECTION, SOLUTION INTRAVENOUS; SUBCUTANEOUS
Refills: 0 | Status: DISCONTINUED | OUTPATIENT
Start: 2025-06-17 | End: 2025-06-17

## 2025-06-17 RX ORDER — TRANEXAMIC ACID 1000 MG/10
1000 AMPUL (ML) INTRAVENOUS ONCE
Refills: 0 | Status: DISCONTINUED | OUTPATIENT
Start: 2025-06-17 | End: 2025-06-17

## 2025-06-17 RX ORDER — ACETAMINOPHEN 500 MG/5ML
975 LIQUID (ML) ORAL ONCE
Refills: 0 | Status: COMPLETED | OUTPATIENT
Start: 2025-06-17 | End: 2025-06-17

## 2025-06-17 RX ORDER — OXYCODONE HYDROCHLORIDE 30 MG/1
5 TABLET ORAL
Refills: 0 | Status: DISCONTINUED | OUTPATIENT
Start: 2025-06-17 | End: 2025-06-18

## 2025-06-17 RX ORDER — POLYETHYLENE GLYCOL 3350 17 G/17G
17 POWDER, FOR SOLUTION ORAL AT BEDTIME
Refills: 0 | Status: DISCONTINUED | OUTPATIENT
Start: 2025-06-17 | End: 2025-06-18

## 2025-06-17 RX ORDER — DEXTROSE 50 % IN WATER 50 %
12.5 SYRINGE (ML) INTRAVENOUS ONCE
Refills: 0 | Status: DISCONTINUED | OUTPATIENT
Start: 2025-06-17 | End: 2025-06-18

## 2025-06-17 RX ORDER — ASPIRIN 325 MG
81 TABLET ORAL EVERY 12 HOURS
Refills: 0 | Status: DISCONTINUED | OUTPATIENT
Start: 2025-06-17 | End: 2025-06-18

## 2025-06-17 RX ORDER — GLUCAGON 3 MG/1
1 POWDER NASAL ONCE
Refills: 0 | Status: DISCONTINUED | OUTPATIENT
Start: 2025-06-17 | End: 2025-06-18

## 2025-06-17 RX ORDER — LEVOTHYROXINE SODIUM 300 MCG
1 TABLET ORAL
Refills: 0 | DISCHARGE

## 2025-06-17 RX ORDER — DEXTROSE 50 % IN WATER 50 %
15 SYRINGE (ML) INTRAVENOUS ONCE
Refills: 0 | Status: DISCONTINUED | OUTPATIENT
Start: 2025-06-17 | End: 2025-06-18

## 2025-06-17 RX ADMIN — Medication 2000 MILLIGRAM(S): at 14:41

## 2025-06-17 RX ADMIN — APREPITANT 40 MILLIGRAM(S): 40 CAPSULE ORAL at 06:15

## 2025-06-17 RX ADMIN — OXYCODONE HYDROCHLORIDE 10 MILLIGRAM(S): 30 TABLET ORAL at 14:00

## 2025-06-17 RX ADMIN — Medication 81 MILLIGRAM(S): at 17:27

## 2025-06-17 RX ADMIN — Medication 0.5 MILLIGRAM(S): at 11:20

## 2025-06-17 RX ADMIN — OXYCODONE HYDROCHLORIDE 10 MILLIGRAM(S): 30 TABLET ORAL at 13:13

## 2025-06-17 RX ADMIN — Medication 2 TABLET(S): at 21:54

## 2025-06-17 RX ADMIN — Medication 975 MILLIGRAM(S): at 22:00

## 2025-06-17 RX ADMIN — Medication 1 TABLET(S): at 13:13

## 2025-06-17 RX ADMIN — INSULIN ASPART 1 EACH: 100 INJECTION, SOLUTION INTRAVENOUS; SUBCUTANEOUS at 19:12

## 2025-06-17 RX ADMIN — Medication 975 MILLIGRAM(S): at 13:13

## 2025-06-17 RX ADMIN — INSULIN ASPART 1 EACH: 100 INJECTION, SOLUTION INTRAVENOUS; SUBCUTANEOUS at 22:01

## 2025-06-17 RX ADMIN — Medication 975 MILLIGRAM(S): at 06:15

## 2025-06-17 RX ADMIN — Medication 0.5 MILLIGRAM(S): at 11:05

## 2025-06-17 RX ADMIN — Medication 975 MILLIGRAM(S): at 14:00

## 2025-06-17 RX ADMIN — Medication 500 MILLILITER(S): at 10:30

## 2025-06-17 RX ADMIN — Medication 75 MILLILITER(S): at 13:11

## 2025-06-17 RX ADMIN — INSULIN ASPART 1 EACH: 100 INJECTION, SOLUTION INTRAVENOUS; SUBCUTANEOUS at 14:03

## 2025-06-17 RX ADMIN — Medication 975 MILLIGRAM(S): at 21:54

## 2025-06-17 RX ADMIN — Medication 20 MILLIGRAM(S): at 13:13

## 2025-06-17 RX ADMIN — Medication 1 APPLICATION(S): at 07:30

## 2025-06-17 NOTE — CONSULT NOTE ADULT - ASSESSMENT
74F PMH hypothyroidism, HLD, DM Type 1 on insulin pump, DVT(s/p left knee surgery 2024, off eliquis, on aspirin), anemia, GERD, skin ca s/p excision presented for surgery. Stated she had a fall in 2024 with patella fracture, had ORIF and nail placement. S/p left knee total replacement on 6/17/25.    Consulted for diabetes management  Home regimen: TSlim insulin pump (settings in HPI)  Current a1c: 7.6%  Outpatient Endocrinologist: Dr Kaur    1. Moderately controlled DM1  - On insulin pump, orders placed  - Continue to monitor FS AC&HS  - Has extra supplies at bedside  - Diabetic diet    2. Left knee arthroplasty  - Pain control, care per ortho    3. Hypothyroid  - Clinically euthyroid, continue home dose levothyroxine
74 yr F Hx of Hypothyroidism, HLD, DM Type 1, IDDM ( dexcom pump), DVT(s/p left knee surgery 2024, off eliquis, on aspirin), anemia, GERD, skin ca s/p excision, she has pain and decreased ROM in left knee. Stated she had a fall in 2024 with patella fracture, had ORIF and nail placement. she has had multiple surgeries to the left knee, she came in here for elective complex left knee total replacement on 6/17/25 s/p procedure, she tolerated procedure.     Plan:      Will monitor for post op hypotension   will monitor for post op blood loss   will management pain and monitor for side effects   will try managing pain without Narcotics if patient require narcotics will monitor for sedation.      DM: FS monitoring, she has been started on her insulin Pump, will be seen by endocrine team    GERD: will continue with famotidine 20 mg once a day    Hx of Hypothyroidism: Will continue with Synthroid 88 mcg once a day    Hx of HLD: will continue with pravastatin 10 mg once a day    Hx of Osteoporosis: On Prolia, injection every 6 months      Left Knee OA s/p TKR post op day 0:     - post op no complications  - VS stable  - abx per ortho   - c/w anti hypertensive to be restarted post op day 02 except if blood pressure goes >150 systolic   - c/w IVF x 24 hrs then reassess per ortho  - opiate induced constipation regimen   - encouraging incentive spirometry   -c/w local wound care per ortho   -DVT prophylaxis and Pain meds as per Ortho team   -PT/OT and weight bearing per ortho       Patient is at high risk for DVT given previous Hx of DVT after knee surgery, would give Lovenox sc for prophylaxis.

## 2025-06-17 NOTE — DISCHARGE NOTE PROVIDER - CARE PROVIDER_API CALL
Dale Damian  Orthopaedic Surgery  [address] [place] [id number]  Phone: [phone]  Fax: [phone]  Follow Up Time:

## 2025-06-17 NOTE — DISCHARGE NOTE PROVIDER - HOSPITAL COURSE
The patient underwent a left TOTAL KNEE REPLACEMENT on 6/17/25. The patient received antibiotics consistent with SCIP guidelines. The patient underwent the procedure and had no intra-operative complications. Post-operatively, the patient was seen by medicine and PT. The patient received aspirin for DVTP. The patient received pain medications per orthopedic pain management protocol and the pain was appropriately controlled. The patient did not have any post-operative medical complications. The patient was discharged in stable condition. The patient underwent a left TOTAL KNEE REPLACEMENT on 6/17/25. The patient received antibiotics consistent with SCIP guidelines. The patient underwent the procedure and had no intra-operative complications. Post-operatively, the patient was seen by medicine and PT. The patient received aspirin for DVTP. The patient received pain medications per orthopedic pain management protocol and the pain was appropriately controlled. The patient did not have any post-operative medical complications. The patient was discharged in stable condition.   You were found to have Hyperglycemia during your stay.  Your diabetes may need closer control and improvement.  Please follow up with your PMD/Endocrinologist upon discharge.  Please follow up [facility] Endocrinology upon discharge.     The patient underwent a left TOTAL KNEE REPLACEMENT on 6/17/25. The patient received antibiotics consistent with SCIP guidelines. The patient underwent the procedure and had no intra-operative complications. Post-operatively, the patient was seen by medicine and PT. The patient received Eliquis 2.5 mg BID for a duration of 2 weeks for DVTP. The patient received pain medications per orthopedic pain management protocol and the pain was appropriately controlled. The patient did not have any post-operative medical complications. The patient was discharged in stable condition.   You were found to have Hyperglycemia during your stay.  Your diabetes may need closer control and improvement.  Please follow up with your PMD/Endocrinologist upon discharge.  Please follow up [facility] Endocrinology upon discharge.

## 2025-06-17 NOTE — CONSULT NOTE ADULT - SUBJECTIVE AND OBJECTIVE BOX
HPI:  74 yr F PMH Hypothyroidism, HLD, DM Type 1, IDDM ( dexcom pump), DVT(s/p left knee surgery 2024, off eliquis, on aspirin), anemia, GERD, skin ca s/p excision seen in PST today for evaluation. Patient complain of pain and decreased ROM in left knee. Stated she had a fall in 2024 with patella fracture, had ORIF and nail placement. Patient states she has had multiple surgeries to the left knee. The pain has gotten worse, pain is 7/10 with movement, she takes tylenol prn but relief not sustained. Patient is scheduled for complex left knee total replacement on 6/17/25.    Consult for diabetes management, a1c 7.6%  TSlim insulin pump with novolog insulin, dexcom sensor  Endocrinologist: Dr Kaur  Onset: DM1 x 15 years  Also hx of hypothyroid, on levothyroxine 88mcg (has been on this dose for many years)    Insulin pump settings:  Basal: 12a: 0.1, 3a: 0.35, 7a: 0.7, 12p: 0.2, 9p: 0.4, 11p: 0.3  CF: 12a: 70, 10a: 60, 4p: 68  ICR: 12a: 1:8, 3a: 1:9, 7a: 1:6.5, 10a: 1:6, 12p: 1:8, 4p: 1:9, 9p: 1:6.5, 11p: 1:7  Target: 110    PAST MEDICAL & SURGICAL HISTORY:  T1DM (type 1 diabetes mellitus)  GERD (gastroesophageal reflux disease)  Osteoarthritis  Anemia  Hypothyroidism  History of DVT of lower extremity  History of stress fracture  History of fractured pelvis  Abnormal EKG  H/O knee surgery  S/P cataract surgery  History of carpal tunnel surgery  S/P decompression of ulnar nerve    FAMILY HISTORY:  FH: HTN (hypertension) (Mother)  FHx: lung cancer (Mother, Father)  FH: skin cancer (Mother)  FH: prostate cancer (Sibling)  FH: type 2 diabetes (Mother)    REVIEW OF SYSTEMS:  Constitutional: No fever, no chills, no change in weight.  Neck: No neck pain, no change in voice.  Lungs: No shortness of breath, no wheezing, no cough  CV: No chest pain, no palpitations, no pain with walking.  GI: No nausea, no vomiting, no constipation, no diarrhea, no abdominal pain  : No urinary frequency, no blood in urine, no urinary burning, no difficulty voiding.  Musculoskeletal: No muscle pain, no joint pain, no swelling.  Neurologic: No headaches, no weakness, no burning or pain in feet, no tremor.    MEDICATIONS  (STANDING):  acetaminophen     Tablet .. 975 milliGRAM(s) Oral every 8 hours  acetaminophen   IVPB .. 1000 milliGRAM(s) IV Intermittent once  aspirin enteric coated 81 milliGRAM(s) Oral every 12 hours  ceFAZolin  Injectable. 2000 milliGRAM(s) IV Push <User Schedule>  dextrose 5%. 1000 milliLiter(s) (50 mL/Hr) IV Continuous <Continuous>  dextrose 5%. 1000 milliLiter(s) (100 mL/Hr) IV Continuous <Continuous>  dextrose 50% Injectable 25 Gram(s) IV Push once  dextrose 50% Injectable 25 Gram(s) IV Push once  dextrose 50% Injectable 12.5 Gram(s) IV Push once  famotidine    Tablet 20 milliGRAM(s) Oral daily  glucagon  Injectable 1 milliGRAM(s) IntraMuscular once  insulin aspart (NovoLOG) Pump 1 Each SubCutaneous Continuous Pump  multivitamin 1 Tablet(s) Oral daily  polyethylene glycol 3350 17 Gram(s) Oral at bedtime  senna 2 Tablet(s) Oral at bedtime  sodium chloride 0.9%. 1000 milliLiter(s) (75 mL/Hr) IV Continuous <Continuous>    MEDICATIONS  (PRN):  dextrose Oral Gel 15 Gram(s) Oral once PRN Blood Glucose LESS THAN 70 milliGRAM(s)/deciliter  magnesium hydroxide Suspension 30 milliLiter(s) Oral daily PRN Constipation  ondansetron Injectable 4 milliGRAM(s) IV Push every 6 hours PRN Nausea and/or Vomiting  oxyCODONE    IR 5 milliGRAM(s) Oral every 3 hours PRN Moderate Pain (4 - 6)  oxyCODONE    IR 10 milliGRAM(s) Oral every 3 hours PRN Severe Pain (7 - 10)  traMADol 50 milliGRAM(s) Oral every 4 hours PRN Mild Pain (1 - 3)    Allergies  surgical tape (Rash)  morphine (Other)  Indocin (Sedation/Somnol)  yellow jacket-insect (Rash)    PHYSICAL EXAM:  General: No apparent distress  Neck: Supple, trachea midline, no thyromegaly  Respiratory: Lungs clear bilaterally, normal rate, effort  Cardiac: +S1, S2, no m/r/g  GI: +BS, soft, non tender, non distended  Extremities: No peripheral edema, no pedal lesions  Neuro: A+O X3, no tremor  Pysch: Affect appropriate   Skin: No acanthosis     Vital Signs Last 24 Hrs  T(C): 36.5 (17 Jun 2025 12:16), Max: 36.9 (17 Jun 2025 05:39)  T(F): 97.7 (17 Jun 2025 12:16), Max: 98.5 (17 Jun 2025 05:39)  HR: 69 (17 Jun 2025 12:16) (60 - 83)  BP: 118/62 (17 Jun 2025 12:16) (102/68 - 136/72)  BP(mean): 82 (17 Jun 2025 11:30) (77 - 93)  RR: 18 (17 Jun 2025 12:16) (12 - 19)  SpO2: 96% (17 Jun 2025 12:16) (95% - 99%)    Parameters below as of 17 Jun 2025 12:16  Patient On (Oxygen Delivery Method): room air    POCT Blood Glucose.: 192 mg/dL (06-17-25 @ 10:12)  POCT Blood Glucose.: 186 mg/dL (06-17-25 @ 08:15)  POCT Blood Glucose.: 158 mg/dL (06-17-25 @ 06:08)

## 2025-06-17 NOTE — CONSULT NOTE ADULT - NS ATTEND AMEND GEN_ALL_CORE FT
The case was reviewed and discussed with NP in details. I agree with above assessment and recommendations.

## 2025-06-17 NOTE — PHYSICAL THERAPY INITIAL EVALUATION ADULT - PERTINENT HX OF CURRENT PROBLEM, REHAB EVAL
74 yr F Hx of Hypothyroidism, HLD, DM Type 1, IDDM ( dexcom pump), DVT(s/p left knee surgery 2024, off eliquis, on aspirin), anemia, GERD, skin ca s/p excision, she has pain and decreased ROM in left knee. Stated she had a fall in 2024 with patella fracture, had ORIF and nail placement. she has had multiple surgeries to the left knee, she came in here for elective complex left knee total replacement on 6/17/25 s/p procedure

## 2025-06-17 NOTE — DISCHARGE NOTE PROVIDER - NSDCFUADDINST_GEN_ALL_CORE_FT
The patient will be seen in the office between 2-3 weeks for wound check.   **Your first post-operative visit has been scheduled prior to your admission. PLEASE CONTACT OFFICE TO CONFIRM THE APPOINTMENT DATE. Sutures/Staples/Tape will be removed at that time.  **  The silver based dressing is to be removed 7 days from the date of surgery.   ** CONTACT THE OFFICE IF THE FOLLOWING DEVELOP:  - the dressing becomes soiled or saturated  - you develop a fever greater that 101F  - the wound becomes red or you develop blistering around the wound  * Patient may shower after post-op day #3.   * The patient will continue home PT consistent with  total knee replacement protocol. Transition to outpatient PT will occur at the time of the first office visit.   * The patient will practice knee extension exercises regularly to minimize hamstring contraction.   * The patient is FULL weight bearing.  * The patient will continue ASPIRIN 81mg twice daily for 6 weeks after surgery for blood clot prevention.  * While on aspirin, the patient will take daily omeprazole or other similar medication to protect the stomach from irritation.   * The patient will take OXYCODONE for pain control and adjust according to prescription and patient needs. AND TYLENOL 975mg every 8 hours for pain. Contact the office if pain increases while taking prescribed pain medications or related concerns develop.  * Celebrex will be taken twice daily for 3 weeks for pain control and prevention of excessive bone growth. Additional prescription may be requested at your office follow-up visit.   * The patient will take Senna S while taking oxycodone to prevent narcotic associated constipation.  Additionally, increase water intake (drink at least 8 glasses of water daily) and try adding fiber to the diet by eating fruits, vegetables and foods that are rich in grains. If constipation is experienced, contact the medical/primary care provider to discuss further treatment options.  * To avoid injury at home:  - continue use of rolling walker until cleared by physical therapist  - have family or friend remove all throw rug or objects in hallways that may present a trip hazard.  - if you experience any dizziness or medical concerns, call your medical doctor or  911.  * The implant may activate metal detection devices.   You were found to have Hyperglycemia/Hypoglycemia during your stay.  Your diabetes may need closer control and improvement.  Please follow up with your PMD/Endocrinologist upon discharge.   The patient will be seen in the office between 2-3 weeks for wound check.   **Your first post-operative visit has been scheduled prior to your admission. PLEASE CONTACT OFFICE TO CONFIRM THE APPOINTMENT DATE. Sutures/Staples/Tape will be removed at that time.  **  The silver based dressing is to be removed 7 days from the date of surgery.   ** CONTACT THE OFFICE IF THE FOLLOWING DEVELOP:  - the dressing becomes soiled or saturated  - you develop a fever greater that 101F  - the wound becomes red or you develop blistering around the wound  * Patient may shower after post-op day #3.   * The patient will continue home PT consistent with  total knee replacement protocol. Transition to outpatient PT will occur at the time of the first office visit.   * The patient will practice knee extension exercises regularly to minimize hamstring contraction.   * The patient is FULL weight bearing.  * The patient will continue ASPIRIN 81mg twice daily for 6 weeks after surgery for blood clot prevention.  * While on aspirin, the patient will take daily omeprazole or other similar medication to protect the stomach from irritation.   * The patient will take OXYCODONE for pain control and adjust according to prescription and patient needs. AND TYLENOL 975mg every 8 hours for pain. Contact the office if pain increases while taking prescribed pain medications or related concerns develop.  * Celebrex will be taken twice daily for 3 weeks for pain control and prevention of excessive bone growth. Additional prescription may be requested at your office follow-up visit.   * The patient will take Senna S while taking oxycodone to prevent narcotic associated constipation.  Additionally, increase water intake (drink at least 8 glasses of water daily) and try adding fiber to the diet by eating fruits, vegetables and foods that are rich in grains. If constipation is experienced, contact the medical/primary care provider to discuss further treatment options.  * To avoid injury at home:  - continue use of rolling walker until cleared by physical therapist  - have family or friend remove all throw rug or objects in hallways that may present a trip hazard.  - if you experience any dizziness or medical concerns, call your medical doctor or  911.  * The implant may activate metal detection devices.   You were found to have Hyperglycemia during your stay.  Your diabetes may need closer control and improvement.  Please follow up with your PMD/Endocrinologist upon discharge.   The patient will be seen in the office between 2-3 weeks for wound check.   **Your first post-operative visit has been scheduled prior to your admission. PLEASE CONTACT OFFICE TO CONFIRM THE APPOINTMENT DATE. Sutures/Staples/Tape will be removed at that time.  **  The silver based dressing is to be removed 7 days from the date of surgery.   ** CONTACT THE OFFICE IF THE FOLLOWING DEVELOP:  - the dressing becomes soiled or saturated  - you develop a fever greater that 101F  - the wound becomes red or you develop blistering around the wound  * Patient may shower after post-op day #3.   * The patient will continue home PT consistent with  total knee replacement protocol. Transition to outpatient PT will occur at the time of the first office visit.   * The patient will practice knee extension exercises regularly to minimize hamstring contraction.   * The patient is FULL weight bearing.  * The patient will continue eliquis 2.5 mg  twice daily for after surgery for blood clot prevention.  * While on aspirin, the patient will take daily omeprazole or other similar medication to protect the stomach from irritation.   * The patient will take OXYCODONE for pain control and adjust according to prescription and patient needs. AND TYLENOL 975mg every 8 hours for pain. Contact the office if pain increases while taking prescribed pain medications or related concerns develop.  * Celebrex will be taken twice daily for 3 weeks for pain control and prevention of excessive bone growth. Additional prescription may be requested at your office follow-up visit.   * The patient will take Senna S while taking oxycodone to prevent narcotic associated constipation.  Additionally, increase water intake (drink at least 8 glasses of water daily) and try adding fiber to the diet by eating fruits, vegetables and foods that are rich in grains. If constipation is experienced, contact the medical/primary care provider to discuss further treatment options.  * To avoid injury at home:  - continue use of rolling walker until cleared by physical therapist  - have family or friend remove all throw rug or objects in hallways that may present a trip hazard.  - if you experience any dizziness or medical concerns, call your medical doctor or  911.  * The implant may activate metal detection devices.   You were found to have Hyperglycemia during your stay.  Your diabetes may need closer control and improvement.  Please follow up with your PMD/Endocrinologist upon discharge.   Follow up with Vascular doctor post operatively   The patient will be seen in the office between 2-3 weeks for wound check.   **Your first post-operative visit has been scheduled prior to your admission. PLEASE CONTACT OFFICE TO CONFIRM THE APPOINTMENT DATE. Sutures/Staples/Tape will be removed at that time.  **  The silver based dressing is to be removed 7 days from the date of surgery.   ** CONTACT THE OFFICE IF THE FOLLOWING DEVELOP:  - the dressing becomes soiled or saturated  - you develop a fever greater that 101F  - the wound becomes red or you develop blistering around the wound  * Patient may shower after post-op day #3.   * The patient will continue home PT consistent with  total knee replacement protocol. Transition to outpatient PT will occur at the time of the first office visit.   * The patient will practice knee extension exercises regularly to minimize hamstring contraction.   * The patient is FULL weight bearing.  * The patient will continue eliquis 2.5 mg  twice daily for after surgery for blood clot prevention.  * Patient has her own eliquis medication at home/ no rx required.    * The patient will take Tramadol for pain control and adjust according to prescription and patient needs. AND TYLENOL 975mg every 8 hours for pain. Contact the office if pain increases while taking prescribed pain medications or related concerns develop.  * Celebrex will be taken twice daily for 3 weeks for pain control and prevention of excessive bone growth. Additional prescription may be requested at your office follow-up visit.   * The patient will take Senna S while taking oxycodone to prevent narcotic associated constipation.  Additionally, increase water intake (drink at least 8 glasses of water daily) and try adding fiber to the diet by eating fruits, vegetables and foods that are rich in grains. If constipation is experienced, contact the medical/primary care provider to discuss further treatment options.  * To avoid injury at home:  - continue use of rolling walker until cleared by physical therapist  - have family or friend remove all throw rug or objects in hallways that may present a trip hazard.  - if you experience any dizziness or medical concerns, call your medical doctor or  911.  * The implant may activate metal detection devices.   You were found to have Hyperglycemia during your stay.  Your diabetes may need closer control and improvement.  Please follow up with your PMD/Endocrinologist upon discharge.   Follow up with Vascular doctor post operatively  to determine duration of DVT treatment post operative

## 2025-06-17 NOTE — ADVANCED PRACTICE NURSE CONSULT - ASSESSMENT
went to see pt in pac[place]. pt is a+ox3 co 0 pain. she states she has type 1 diabetes for 15 yrs and for 12 years she is on the pump. she nuno cuellar and nuno [address] [facility]. she has novolog infusing at 0.7units/hr sensitivity 60 carb :insulin ratio 1:6. the pump is connected to dexcom g6.pump insertion site intact on rt abd and g6 on left. the insertion site is due to be changed on Wednesday. she has pump supply w her.

## 2025-06-17 NOTE — CONSULT NOTE ADULT - SUBJECTIVE AND OBJECTIVE BOX
74 yr F Hx of Hypothyroidism, HLD, DM Type 1, IDDM ( dexcom pump), DVT(s/p left knee surgery 2024, off eliquis, on aspirin), anemia, GERD, skin ca s/p excision, she has pain and decreased ROM in left knee. Stated she had a fall in 2024 with patella fracture, had ORIF and nail placement. she has had multiple surgeries to the left knee, she came in here for elective complex left knee total replacement on 6/17/25 s/p procedure, she tolerated procedure,           Allergies:  	yellow jacket-insect [freetext allergy; no alerts]: Miscellaneous, Rash, yellow jacket-insect  	surgical tape [freetext allergy; no alerts]: Miscellaneous, Rash, surgical tape  	Indocin: Drug, Sedation/Somnol  	morphine: Drug, Other, Jerking movement    PAST MEDICAL HISTORY:  Abnormal EKG     Anemia     GERD (gastroesophageal reflux disease)     History of DVT of lower extremity     History of fractured pelvis     History of stress fracture     Hypothyroidism     Osteoarthritis     T1DM (type 1 diabetes mellitus).     PAST SURGICAL HISTORY:  H/O knee surgery     History of carpal tunnel surgery     S/P cataract surgery     S/P decompression of ulnar nerve.     FAMILY HISTORY:  Father  Still living? Unknown  FHx: lung cancer, Age at diagnosis: Age Unknown    Mother  Still living? Unknown  FH: HTN (hypertension), Age at diagnosis: Age Unknown  FH: skin cancer, Age at diagnosis: Age Unknown  FH: type 2 diabetes, Age at diagnosis: Age Unknown  FHx: lung cancer, Age at diagnosis: Age Unknown    Sibling  Still living? Unknown  FH: prostate cancer, Age at diagnosis: Age Unknown.        Social History:   Not a smoker, drinker or using any drugs     Home Medications:   * Patient Currently Takes Medications as of 28-May-2025 19:05 documented in Structured Notes  · 	naproxen 500 mg oral tablet: Last Dose Taken:  , 1 tab(s) orally 2 times a day as needed for  moderate pain  · 	Tylenol 500 mg oral tablet: Last Dose Taken:  , 1 tab(s) orally every 6 hours as needed for  moderate pain  · 	famotidine 20 mg oral tablet: Last Dose Taken:  , 1 tab(s) orally once a day  · 	aspirin 81 mg oral tablet: Last Dose Taken:  , orally once a day  · 	Synthroid 88 mcg (0.088 mg) oral tablet: Last Dose Taken:  , 1 tab(s) orally once a day  · 	pravastatin 10 mg oral tablet: Last Dose Taken:  , 1 tab(s) orally once a day  · 	Allegra 180 mg oral tablet: Last Dose Taken:  , 1 tab(s) orally once a day  · 	NovoLOG 100 units/mL injectable solution: Last Dose Taken:  , injectable 3 times a day via insulin pump  · 	multivitamin: Last Dose Taken:  , 1 daily  · 	d3: Last Dose Taken:  , 1 tab daily  · 	iron supplement: Last Dose Taken:  , 1 tab daily  · 	multivitamin gummy: 1 tab daily  · 	Prolia: Last Dose Taken:  , injection every 6 months   74 yr F Hx of Hypothyroidism, HLD, DM Type 1, IDDM ( dexcom pump), DVT(s/p left knee surgery 2024, off eliquis, on aspirin), anemia, GERD, skin ca s/p excision, she has pain and decreased ROM in left knee. Stated she had a fall in 2024 with patella fracture, had ORIF and nail placement. she has had multiple surgeries to the left knee, she came in here for elective complex left knee total replacement on 6/17/25 s/p procedure, she tolerated procedure, her pain is well controlled, denies fever, chills, chest pain, sob          Allergies:  	yellow jacket-insect [freetext allergy; no alerts]: Miscellaneous, Rash, yellow jacket-insect  	surgical tape [freetext allergy; no alerts]: Miscellaneous, Rash, surgical tape  	Indocin: Drug, Sedation/Somnol  	morphine: Drug, Other, Jerking movement    PAST MEDICAL HISTORY:  Abnormal EKG     Anemia     GERD (gastroesophageal reflux disease)     History of DVT of lower extremity     History of fractured pelvis     History of stress fracture     Hypothyroidism     Osteoarthritis     T1DM (type 1 diabetes mellitus).     PAST SURGICAL HISTORY:  H/O knee surgery     History of carpal tunnel surgery     S/P cataract surgery     S/P decompression of ulnar nerve.     FAMILY HISTORY:  Father  Still living? Unknown  FHx: lung cancer, Age at diagnosis: Age Unknown    Mother  Still living? Unknown  FH: HTN (hypertension), Age at diagnosis: Age Unknown  FH: skin cancer, Age at diagnosis: Age Unknown  FH: type 2 diabetes, Age at diagnosis: Age Unknown  FHx: lung cancer, Age at diagnosis: Age Unknown    Sibling  Still living? Unknown  FH: prostate cancer, Age at diagnosis: Age Unknown.        Social History:   Not a smoker, drinker or using any drugs     Home Medications:   * Patient Currently Takes Medications as of 28-May-2025 19:05 documented in Structured Notes  · 	naproxen 500 mg oral tablet: Last Dose Taken:  , 1 tab(s) orally 2 times a day as needed for  moderate pain  · 	Tylenol 500 mg oral tablet: Last Dose Taken:  , 1 tab(s) orally every 6 hours as needed for  moderate pain  · 	famotidine 20 mg oral tablet: Last Dose Taken:  , 1 tab(s) orally once a day  · 	aspirin 81 mg oral tablet: Last Dose Taken:  , orally once a day  · 	Synthroid 88 mcg (0.088 mg) oral tablet: Last Dose Taken:  , 1 tab(s) orally once a day  · 	pravastatin 10 mg oral tablet: Last Dose Taken:  , 1 tab(s) orally once a day  · 	Allegra 180 mg oral tablet: Last Dose Taken:  , 1 tab(s) orally once a day  · 	NovoLOG 100 units/mL injectable solution: Last Dose Taken:  , injectable 3 times a day via insulin pump  · 	multivitamin: Last Dose Taken:  , 1 daily  · 	d3: Last Dose Taken:  , 1 tab daily  · 	iron supplement: Last Dose Taken:  , 1 tab daily  · 	multivitamin gummy: 1 tab daily  · 	Prolia: Last Dose Taken:  , injection every 6 months    Vital Signs Last 24 Hrs  T(C): 36.5 (17 Jun 2025 12:16), Max: 36.9 (17 Jun 2025 05:39)  T(F): 97.7 (17 Jun 2025 12:16), Max: 98.5 (17 Jun 2025 05:39)  HR: 69 (17 Jun 2025 12:16) (60 - 83)  BP: 118/62 (17 Jun 2025 12:16) (102/68 - 136/72)  BP(mean): 82 (17 Jun 2025 11:30) (77 - 93)  RR: 18 (17 Jun 2025 12:16) (12 - 19)  SpO2: 96% (17 Jun 2025 12:16) (95% - 99%)    Parameters below as of 17 Jun 2025 12:16  Patient On (Oxygen Delivery Method): room air        PHYSICAL EXAM:    GENERAL: Elderly female looking comfortable   HEENT: PERRL, +EOMI  NECK: soft, Supple, No JVD  CHEST/LUNG: Clear to auscultate bilaterally; No wheezing  HEART: S1S2+, Regular rate and rhythm; No murmurs  ABDOMEN: Soft, Nontender, Nondistended; Bowel sounds present  EXTREMITIES:  1+ Peripheral Pulses, No edema, left Knee Joint area cover with dressing, no bleeding or soaking   SKIN: No rashes or lesions  NEURO: AAOX3  PSYCH: normal mood

## 2025-06-17 NOTE — ASU PREOP CHECKLIST - INTERNAL PROSTHESES
left knee hardware left knee hardware, right wrist and right toe left knee hardware, right wrist and right toe, insulin pump

## 2025-06-17 NOTE — PROGRESS NOTE ADULT - SUBJECTIVE AND OBJECTIVE BOX
LUIS [place]  [id number]    History: 74y Female is status post left total knee arthroplasty on 6/17/25, POD #0. Patient is doing well and is comfortable. The patient's pain is controlled using the prescribed pain medications. The patient is participating in physical therapy. Denies nausea, vomiting, chest pain, shortness of breath, abdominal pain or fever. No new complaints.    MEDICATIONS  (STANDING):  acetaminophen     Tablet .. 975 milliGRAM(s) Oral every 8 hours  acetaminophen   IVPB .. 1000 milliGRAM(s) IV Intermittent once  aspirin enteric coated 81 milliGRAM(s) Oral every 12 hours  ceFAZolin  Injectable. 2000 milliGRAM(s) IV Push <User Schedule>  dextrose 5%. 1000 milliLiter(s) (50 mL/Hr) IV Continuous <Continuous>  dextrose 5%. 1000 milliLiter(s) (100 mL/Hr) IV Continuous <Continuous>  dextrose 50% Injectable 25 Gram(s) IV Push once  dextrose 50% Injectable 12.5 Gram(s) IV Push once  dextrose 50% Injectable 25 Gram(s) IV Push once  famotidine    Tablet 20 milliGRAM(s) Oral daily  glucagon  Injectable 1 milliGRAM(s) IntraMuscular once  insulin aspart (NovoLOG) Pump 1 Each SubCutaneous Continuous Pump  lactated ringers. 1000 milliLiter(s) (75 mL/Hr) IV Continuous <Continuous>  multivitamin 1 Tablet(s) Oral daily  polyethylene glycol 3350 17 Gram(s) Oral at bedtime  senna 2 Tablet(s) Oral at bedtime  sodium chloride 0.9%. 1000 milliLiter(s) (75 mL/Hr) IV Continuous <Continuous>    MEDICATIONS  (PRN):  dextrose Oral Gel 15 Gram(s) Oral once PRN Blood Glucose LESS THAN 70 milliGRAM(s)/deciliter  fentaNYL    Injectable 50 MICROGram(s) IV Push every 5 minutes PRN Severe Pain (7 - 10)  HYDROmorphone  Injectable 0.5 milliGRAM(s) IV Push every 10 minutes PRN Moderate Pain (4 - 6)  magnesium hydroxide Suspension 30 milliLiter(s) Oral daily PRN Constipation  ondansetron Injectable 4 milliGRAM(s) IV Push every 6 hours PRN Nausea and/or Vomiting  ondansetron Injectable 4 milliGRAM(s) IV Push once PRN Nausea and/or Vomiting  oxyCODONE    IR 5 milliGRAM(s) Oral every 3 hours PRN Moderate Pain (4 - 6)  oxyCODONE    IR 10 milliGRAM(s) Oral every 3 hours PRN Severe Pain (7 - 10)  traMADol 50 milliGRAM(s) Oral every 4 hours PRN Mild Pain (1 - 3)    Physical exam: The left knee dressing is clean, dry and intact. No drainage or discharge.  No calf tenderness. Sensation to light touch is grossly intact distally. Motor function distally is 5/5. Extensor hallucis longus and flexor hallucis longus are intact. No foot drop. 2+ dorsalis pedis pulse. Capillary refill is less than 2 seconds. No cyanosis.    Primary Orthopedic Assessment:  • s/p LEFT total knee replacement    Plan:   • DVT prophylaxis as prescribed, including use of compression devices and ankle pumps  • Continue physical therapy  • Weightbearing as tolerated of the left lower extremity with assistance of a walker  • Incentive spirometry encouraged  • Pain control as clinically indicated  • Discharge planning – anticipated discharge is Home

## 2025-06-17 NOTE — PHYSICAL THERAPY INITIAL EVALUATION ADULT - ACTIVE RANGE OF MOTION EXAMINATION, REHAB EVAL
L knee flexion 0-60/bilateral upper extremity Active ROM was WFL (within functional limits)/Right LE Active ROM was WFL (within functional limits)

## 2025-06-17 NOTE — DISCHARGE NOTE PROVIDER - NSDCMRMEDTOKEN_GEN_ALL_CORE_FT
Allegra 180 mg oral tablet: 1 tab(s) orally once a day  aspirin 81 mg oral tablet: orally once a day  d3: 1 tab daily  famotidine 20 mg oral tablet: 1 tab(s) orally once a day  iron supplement: 1 tab daily  multivitamin: 1 daily  multivitamin gummy: 1 tab daily  naproxen 500 mg oral tablet: 1 tab(s) orally 2 times a day as needed for  moderate pain  NovoLOG 100 units/mL injectable solution: injectable 3 times a day via insulin pump  pravastatin 10 mg oral tablet: 1 tab(s) orally once a day  Prolia: injection every 6 months  Synthroid 88 mcg (0.088 mg) oral tablet: 1 tab(s) orally once a day  Tylenol 500 mg oral tablet: 1 tab(s) orally every 6 hours as needed for  moderate pain   acetaminophen 325 mg oral tablet: 3 tab(s) orally every 8 hours  Allegra 180 mg oral tablet: 1 tab(s) orally once a day  aspirin 81 mg oral delayed release tablet: 1 tab(s) orally 2 times a day  CeleBREX 200 mg oral capsule: 1 cap(s) orally 2 times a day  famotidine 20 mg oral tablet: 1 tab(s) orally once a day  Narcan 4 mg/0.1 mL nasal spray: 1 spray(s) intranasally once  NovoLOG 100 units/mL injectable solution: injectable 3 times a day via insulin pump  oxyCODONE 5 mg oral tablet: 1 tab(s) orally every 6 hours as needed for  moderate pain MDD: 4  pravastatin 10 mg oral tablet: 1 tab(s) orally once a day  Senna S 50 mg-8.6 mg oral tablet: 2 tab(s) orally once a day  Synthroid 88 mcg (0.088 mg) oral tablet: 1 tab(s) orally once a day   acetaminophen 325 mg oral tablet: 3 tab(s) orally every 8 hours  Allegra 180 mg oral tablet: 1 tab(s) orally once a day  CeleBREX 200 mg oral capsule: 1 cap(s) orally 2 times a day  famotidine 20 mg oral tablet: 1 tab(s) orally once a day  Narcan 4 mg/0.1 mL nasal spray: 1 spray(s) intranasally once  NovoLOG 100 units/mL injectable solution: injectable 3 times a day via insulin pump  pravastatin 10 mg oral tablet: 1 tab(s) orally once a day  Senna S 50 mg-8.6 mg oral tablet: 2 tab(s) orally once a day  Synthroid 88 mcg (0.088 mg) oral tablet: 1 tab(s) orally once a day  Ultram 50 mg oral tablet: 1 tab(s) orally every 6 hours MDD: 4

## 2025-06-17 NOTE — DISCHARGE NOTE NURSING/CASE MANAGEMENT/SOCIAL WORK - FINANCIAL ASSISTANCE
[facility] provides services at a reduced cost to those who are determined to be eligible through [facility]’s financial assistance program. Information regarding [facility]’s financial assistance program can be found by going to [email or web address] or by calling [phone].

## 2025-06-17 NOTE — DISCHARGE NOTE NURSING/CASE MANAGEMENT/SOCIAL WORK - PATIENT PORTAL LINK FT
You can access the FollowMyHealth Patient Portal offered by [facility] by registering at the following website: [email or web address]. By joining [facility]’s FollowMyHealth portal, you will also be able to view your health information using other applications (apps) compatible with our system.

## 2025-06-17 NOTE — DISCHARGE NOTE PROVIDER - NSDCFUSCHEDAPPT_GEN_ALL_CORE_FT
Elebiary, Yeon J  [facility]  ORTHOSURG [address]  Scheduled Appointment: 07/14/2025    Dale Damian  [facility]  ORTHOSUR [address]  Scheduled Appointment: 08/18/2025

## 2025-06-18 VITALS
DIASTOLIC BLOOD PRESSURE: 66 MMHG | TEMPERATURE: 98 F | RESPIRATION RATE: 18 BRPM | HEART RATE: 79 BPM | SYSTOLIC BLOOD PRESSURE: 120 MMHG | OXYGEN SATURATION: 94 %

## 2025-06-18 LAB — GLUCOSE BLDC GLUCOMTR-MCNC: 275 MG/DL — HIGH (ref 70–99)

## 2025-06-18 PROCEDURE — 36415 COLL VENOUS BLD VENIPUNCTURE: CPT

## 2025-06-18 PROCEDURE — 86850 RBC ANTIBODY SCREEN: CPT

## 2025-06-18 PROCEDURE — 97163 PT EVAL HIGH COMPLEX 45 MIN: CPT

## 2025-06-18 PROCEDURE — C1713: CPT

## 2025-06-18 PROCEDURE — 86901 BLOOD TYPING SEROLOGIC RH(D): CPT

## 2025-06-18 PROCEDURE — 97110 THERAPEUTIC EXERCISES: CPT

## 2025-06-18 PROCEDURE — 99233 SBSQ HOSP IP/OBS HIGH 50: CPT

## 2025-06-18 PROCEDURE — 73560 X-RAY EXAM OF KNEE 1 OR 2: CPT

## 2025-06-18 PROCEDURE — 97116 GAIT TRAINING THERAPY: CPT

## 2025-06-18 PROCEDURE — C1889: CPT

## 2025-06-18 PROCEDURE — 82962 GLUCOSE BLOOD TEST: CPT

## 2025-06-18 PROCEDURE — C1776: CPT

## 2025-06-18 PROCEDURE — 27447 TOTAL KNEE ARTHROPLASTY: CPT

## 2025-06-18 PROCEDURE — 86900 BLOOD TYPING SEROLOGIC ABO: CPT

## 2025-06-18 PROCEDURE — 99232 SBSQ HOSP IP/OBS MODERATE 35: CPT

## 2025-06-18 RX ORDER — CEFAZOLIN SODIUM IN 0.9 % NACL 3 G/100 ML
2000 INTRAVENOUS SOLUTION, PIGGYBACK (ML) INTRAVENOUS ONCE
Refills: 0 | Status: COMPLETED | OUTPATIENT
Start: 2025-06-18 | End: 2025-06-18

## 2025-06-18 RX ORDER — ASPIRIN 325 MG
1 TABLET ORAL
Qty: 84 | Refills: 0
Start: 2025-06-18 | End: 2025-07-29

## 2025-06-18 RX ORDER — APIXABAN 2.5 MG/1
2.5 TABLET, FILM COATED ORAL
Refills: 0 | Status: DISCONTINUED | OUTPATIENT
Start: 2025-06-18 | End: 2025-06-18

## 2025-06-18 RX ORDER — TRAMADOL HYDROCHLORIDE 50 MG/1
1 TABLET, FILM COATED ORAL
Qty: 28 | Refills: 0
Start: 2025-06-18 | End: 2025-06-24

## 2025-06-18 RX ORDER — ACETAMINOPHEN 500 MG/5ML
1 LIQUID (ML) ORAL
Refills: 0 | DISCHARGE

## 2025-06-18 RX ORDER — CELECOXIB 50 MG/1
1 CAPSULE ORAL
Qty: 60 | Refills: 0
Start: 2025-06-18 | End: 2025-07-17

## 2025-06-18 RX ORDER — NALOXONE HYDROCHLORIDE 0.4 MG/ML
1 INJECTION, SOLUTION INTRAMUSCULAR; INTRAVENOUS; SUBCUTANEOUS
Qty: 1 | Refills: 0
Start: 2025-06-18

## 2025-06-18 RX ORDER — ATORVASTATIN CALCIUM 80 MG/1
10 TABLET, FILM COATED ORAL AT BEDTIME
Refills: 0 | Status: DISCONTINUED | OUTPATIENT
Start: 2025-06-18 | End: 2025-06-18

## 2025-06-18 RX ORDER — ASPIRIN 325 MG
1 TABLET ORAL
Refills: 0 | DISCHARGE

## 2025-06-18 RX ORDER — NAPROXEN SODIUM 275 MG
1 TABLET ORAL
Refills: 0 | DISCHARGE

## 2025-06-18 RX ORDER — DENOSUMAB 60 MG/ML
INJECTION SUBCUTANEOUS
Refills: 0 | DISCHARGE

## 2025-06-18 RX ORDER — B1/B2/B3/B5/B6/B12/VIT C/FOLIC 500-0.5 MG
TABLET ORAL
Refills: 0 | DISCHARGE

## 2025-06-18 RX ORDER — SENNOSIDES, DOCUSATE SODIUM 8.6; 5 MG/1; MG/1
2 TABLET ORAL
Qty: 14 | Refills: 0
Start: 2025-06-18 | End: 2025-06-24

## 2025-06-18 RX ORDER — ACETAMINOPHEN 500 MG/5ML
3 LIQUID (ML) ORAL
Qty: 0 | Refills: 0 | DISCHARGE
Start: 2025-06-18

## 2025-06-18 RX ORDER — OXYCODONE HYDROCHLORIDE 30 MG/1
1 TABLET ORAL
Qty: 28 | Refills: 0
Start: 2025-06-18 | End: 2025-06-24

## 2025-06-18 RX ADMIN — OXYCODONE HYDROCHLORIDE 5 MILLIGRAM(S): 30 TABLET ORAL at 01:26

## 2025-06-18 RX ADMIN — Medication 20 MILLIGRAM(S): at 11:22

## 2025-06-18 RX ADMIN — Medication 1000 MILLIGRAM(S): at 05:30

## 2025-06-18 RX ADMIN — Medication 975 MILLIGRAM(S): at 14:22

## 2025-06-18 RX ADMIN — Medication 4 MILLIGRAM(S): at 05:32

## 2025-06-18 RX ADMIN — Medication 975 MILLIGRAM(S): at 13:10

## 2025-06-18 RX ADMIN — OXYCODONE HYDROCHLORIDE 10 MILLIGRAM(S): 30 TABLET ORAL at 05:27

## 2025-06-18 RX ADMIN — Medication 81 MILLIGRAM(S): at 05:23

## 2025-06-18 RX ADMIN — Medication 2000 MILLIGRAM(S): at 02:03

## 2025-06-18 RX ADMIN — TRAMADOL HYDROCHLORIDE 50 MILLIGRAM(S): 50 TABLET, FILM COATED ORAL at 12:20

## 2025-06-18 RX ADMIN — Medication 88 MICROGRAM(S): at 05:23

## 2025-06-18 RX ADMIN — TRAMADOL HYDROCHLORIDE 50 MILLIGRAM(S): 50 TABLET, FILM COATED ORAL at 11:22

## 2025-06-18 RX ADMIN — Medication 1000 MILLILITER(S): at 11:22

## 2025-06-18 RX ADMIN — OXYCODONE HYDROCHLORIDE 10 MILLIGRAM(S): 30 TABLET ORAL at 06:27

## 2025-06-18 RX ADMIN — Medication 400 MILLIGRAM(S): at 05:24

## 2025-06-18 RX ADMIN — INSULIN ASPART 1 EACH: 100 INJECTION, SOLUTION INTRAVENOUS; SUBCUTANEOUS at 11:34

## 2025-06-18 RX ADMIN — OXYCODONE HYDROCHLORIDE 5 MILLIGRAM(S): 30 TABLET ORAL at 02:26

## 2025-06-18 NOTE — PROGRESS NOTE ADULT - NS ATTEND AMEND GEN_ALL_CORE FT
The case was reviewed and discussed with NP in details. Agree with above assessment and recommendations.
Patient is see and evaluated, chart reviewed in detail, agree with assessment and plan of the NP  patient is having some nausea  CTA b/l   left knee Joint area is covered with clean dressing, no bleeding or soaking  Will continue with current plan of care  IV fluids.  once nausea is better then would be ok from the medicine point of view for discharge.     Plan of care discussed with Ortho team    Medicine will follow along    36 minutes spent on total encounter. The necessity of the time spent during the encounter on this date of service.

## 2025-06-18 NOTE — PROGRESS NOTE ADULT - SUBJECTIVE AND OBJECTIVE BOX
LUIS [place]  [id number]    History: 74y Female is status post left total knee arthroplasty with removal of hardware on 6/17, POD#1. Patient is doing well and is comfortable. The patient's pain is controlled using the prescribed pain medications. The patient is participating in physical therapy, WBAT LLE. Denies nausea, vomiting, chest pain, shortness of breath, abdominal pain or fever. No new orthopedic complaints.    MEDICATIONS  (STANDING):  acetaminophen     Tablet .. 975 milliGRAM(s) Oral every 8 hours  aspirin enteric coated 81 milliGRAM(s) Oral every 12 hours  ceFAZolin  Injectable. 2000 milliGRAM(s) IV Push <User Schedule>  dextrose 5%. 1000 milliLiter(s) (50 mL/Hr) IV Continuous <Continuous>  dextrose 5%. 1000 milliLiter(s) (100 mL/Hr) IV Continuous <Continuous>  dextrose 50% Injectable 25 Gram(s) IV Push once  dextrose 50% Injectable 12.5 Gram(s) IV Push once  dextrose 50% Injectable 25 Gram(s) IV Push once  famotidine    Tablet 20 milliGRAM(s) Oral daily  glucagon  Injectable 1 milliGRAM(s) IntraMuscular once  insulin aspart (NovoLOG) Pump 1 Each SubCutaneous Continuous Pump  levothyroxine 88 MICROGram(s) Oral daily  multivitamin 1 Tablet(s) Oral daily  polyethylene glycol 3350 17 Gram(s) Oral at bedtime  senna 2 Tablet(s) Oral at bedtime  sodium chloride 0.9%. 1000 milliLiter(s) (75 mL/Hr) IV Continuous <Continuous>    MEDICATIONS  (PRN):  dextrose Oral Gel 15 Gram(s) Oral once PRN Blood Glucose LESS THAN 70 milliGRAM(s)/deciliter  magnesium hydroxide Suspension 30 milliLiter(s) Oral daily PRN Constipation  ondansetron Injectable 4 milliGRAM(s) IV Push every 6 hours PRN Nausea and/or Vomiting  oxyCODONE    IR 5 milliGRAM(s) Oral every 3 hours PRN Moderate Pain (4 - 6)  oxyCODONE    IR 10 milliGRAM(s) Oral every 3 hours PRN Severe Pain (7 - 10)  traMADol 50 milliGRAM(s) Oral every 4 hours PRN Mild Pain (1 - 3)      Physical exam: The left knee dressing remains  clean, dry and intact. No drainage or discharge. No erythema is noted. No blistering. No ecchymosis. The calf is supple nontender. Passive range of motion is acceptable to due postoperative pain. No calf tenderness. Sensation to light touch is grossly intact distally. Motor function distally is 5/5. Extensor hallucis longus and flexor hallucis longus are intact. No foot drop. 2+ dorsalis pedis pulse. Capillary refill is less than 2 seconds. No cyanosis.    Primary Orthopedic Assessment:  •	s/p LEFT total knee replacement    Secondary  Orthopedic Assessment(s):   •	    Secondary  Medical Assessment(s):   •	    Plan:   •	DVT prophylaxis as prescribed, including use of compression devices and ankle pumps  •	Continue physical therapy  •	Weightbearing as tolerated of the Left lower extremity with assistance of a walker  •	Incentive spirometry encouraged  •	Pain control as clinically indicated  •	Discharge planning – anticipated discharge is Home once cleared by PT and medicine

## 2025-06-18 NOTE — PROGRESS NOTE ADULT - PROVIDER SPECIALTY LIST ADULT
Endocrinology
Patient Education   Patient Education     Gout Diet  Gout is a painful condition caused by an excess of uric acid, a waste product made by the body. Uric acid forms crystals that collect in the joints. This brings on symptoms of joint pain and swelling. This is called a gout attack. Often, medications and diet changes are combined to manage gout. Below are some guidelines for changing your diet to help you manage gout and prevent attacks. Your health care provider will help you determine the best eating plan for you.     Limiting or avoiding certain foods can help prevent gout attacks.   Eating to manage gout  Weight loss for those who are overweight may help reduce gout attacks.  Eat less of these foods  Eating too many foods containing purines may raise the levels of uric acid in your body. This raises your risk for a gout attack. Try to limit these foods and drinks:  · Alcohol, such as beer and red wine. You may be told to avoid alcohol completely.  · Soft drinks that contain sugar or high fructose corn syrup  · Certain fish, including anchovies, sardines, fish eggs, and herring  · Certain meats, such as red meat, hot dogs, luncheon meats, and turkey  · Organ meats, such as liver, kidneys, and sweetbreads  · Legumes, such as dried beans and peas  · Mushrooms, spinach, asparagus, and cauliflower  · Other high fat foods such as gravy, whole milk, and high fat cheeses  Eat more of these foods  Other foods may be helpful for people with gout. Add some of these foods to your diet:  · Dark berries, such as blueberries, blackberries, and cherries. These contain chemicals that may lower uric acid.  · Tofu, a source of protein made from soy. Studies have shown that it may be a better choice than meat for people with gout.  · Omega fatty acids. These are found in some fatty fish such as salmon, certain oils (flax, olive, or nut), and nuts themselves. Omega fatty acids may help prevent inflammation due to gout.  · Dairy 
Orthopedics
Orthopedics
products that are low-fat or fat-free, such as cheese and yogurt  · Complex carbohydrate foods, including whole grains, brown rice, oats, and beans  · Coffee, in moderation  Follow-up care  Follow up with your health care provider, or as advised.  When to seek medical advice  Call your health care provider right away if any of these occur:  · Return of gout symptoms, usually at night:  · Severe pain, swelling, and heat in a joint, especially the base of the big toe  · Affected joint is hard to move  · Skin of the affected joint is purple or red  · Fever of 100.4°F (38°C) or higher  · Pain that doesn't get better even with prescribed medicine   © 6243-0361 [employer]. [address] [place] [address], [place] [address]. All rights reserved. This information is not intended as a substitute for professional medical care. Always follow your healthcare professional's instructions.           Treating Gout Attacks  Gout is a disease that affects the joints. Left untreated, it can lead to painful foot deformity and even kidney problems. But, by treating gout early, you can relieve pain and help prevent future problems. Gout can usually be treated with medication and proper diet. In severe cases, surgery may be needed.  Gout attacks are painful and often happen more than once. Taking medications may reduce pain and prevent attacks in the future. There are also some things you can do at home to relieve symptoms.    Medications for Gout  Your health care provider may prescribe a daily medication to reduce levels of uric acid. This may help prevent gout attacks. Other medications can help relieve pain and swelling during an attack. Be sure to take your medication as directed.  What You Can Do  Below are some things you can do at home to relieve gout symptoms. Your health care provider may have other tips.  · Rest the painful joint as much as you can.  · Raise the painful joint so it is at a level higher than your 
Hospitalist
heart.  How Can I Prevent Gout?  With a little effort, you may be able to prevent gout attacks in the future. Here are some things you can do:  · Avoid alcohol and foods that trigger gout.  · Avoid foods high in purines  ¨ Certain meats (red meat, processed meat, turkey)  ¨ Organ meats (kidney, liver, sweetbread)  ¨ Shellfish (lobster, crab, shrimp, scallop, mussel)  ¨ Certain fish (anchovy, sardine, herring, mackerel)  ¨ Take any medications prescribed by your health care provider.  ¨ Lose weight if you need to.  ¨ Control blood pressure and cholesterol.  ¨ Drink plenty of water to help flush uric acid from your body.  © 3877-1701 [employer]. [address] [place] [address], [place] [address]. All rights reserved. This information is not intended as a substitute for professional medical care. Always follow your healthcare professional's instructions.

## 2025-06-18 NOTE — PROGRESS NOTE ADULT - ASSESSMENT
74F PMH hypothyroidism, HLD, DM Type 1 on insulin pump, DVT(s/p left knee surgery 2024, off eliquis, on aspirin), anemia, GERD, skin ca s/p excision presented for surgery. Stated she had a fall in 2024 with patella fracture, had ORIF and nail placement. S/p left knee total replacement on 6/17/25.    Consulted for diabetes management  Home regimen: TSlim insulin pump (settings in HPI)  Current a1c: 7.6%  Outpatient Endocrinologist: Dr Kaur    1. Moderately controlled DM1  - On insulin pump, orders placed  - Continue to monitor FS AC&HS  - Has extra supplies at bedside  - Diabetic diet    2. Left knee arthroplasty  - Pain control, care per ortho    3. Hypothyroid  - Clinically euthyroid, continue home dose levothyroxine

## 2025-06-18 NOTE — PROGRESS NOTE ADULT - SUBJECTIVE AND OBJECTIVE BOX
INTERVAL EVENTS:  Follow up diabetes management. On insulin pump, denies acute pain.     MEDICATIONS  (STANDING):  acetaminophen     Tablet .. 975 milliGRAM(s) Oral every 8 hours  aspirin enteric coated 81 milliGRAM(s) Oral every 12 hours  ceFAZolin  Injectable. 2000 milliGRAM(s) IV Push <User Schedule>  dextrose 5%. 1000 milliLiter(s) (50 mL/Hr) IV Continuous <Continuous>  dextrose 5%. 1000 milliLiter(s) (100 mL/Hr) IV Continuous <Continuous>  dextrose 50% Injectable 25 Gram(s) IV Push once  dextrose 50% Injectable 12.5 Gram(s) IV Push once  dextrose 50% Injectable 25 Gram(s) IV Push once  famotidine    Tablet 20 milliGRAM(s) Oral daily  glucagon  Injectable 1 milliGRAM(s) IntraMuscular once  insulin aspart (NovoLOG) Pump 1 Each SubCutaneous Continuous Pump  levothyroxine 88 MICROGram(s) Oral daily  multivitamin 1 Tablet(s) Oral daily  polyethylene glycol 3350 17 Gram(s) Oral at bedtime  senna 2 Tablet(s) Oral at bedtime  sodium chloride 0.9%. 1000 milliLiter(s) (75 mL/Hr) IV Continuous <Continuous>    MEDICATIONS  (PRN):  dextrose Oral Gel 15 Gram(s) Oral once PRN Blood Glucose LESS THAN 70 milliGRAM(s)/deciliter  magnesium hydroxide Suspension 30 milliLiter(s) Oral daily PRN Constipation  ondansetron Injectable 4 milliGRAM(s) IV Push every 6 hours PRN Nausea and/or Vomiting  oxyCODONE    IR 5 milliGRAM(s) Oral every 3 hours PRN Moderate Pain (4 - 6)  oxyCODONE    IR 10 milliGRAM(s) Oral every 3 hours PRN Severe Pain (7 - 10)  traMADol 50 milliGRAM(s) Oral every 4 hours PRN Mild Pain (1 - 3)    Allergies  surgical tape (Rash)  morphine (Other)  Indocin (Sedation/Somnol)  yellow jacket-insect (Rash)    Vital Signs Last 24 Hrs  T(C): 36.6 (18 Jun 2025 05:21), Max: 36.7 (17 Jun 2025 10:15)  T(F): 97.8 (18 Jun 2025 05:21), Max: 98 (17 Jun 2025 10:15)  HR: 70 (18 Jun 2025 05:21) (58 - 83)  BP: 134/72 (18 Jun 2025 05:21) (102/68 - 136/72)  BP(mean): 82 (17 Jun 2025 11:30) (77 - 93)  RR: 17 (18 Jun 2025 05:21) (12 - 19)  SpO2: 92% (18 Jun 2025 05:21) (92% - 98%)    Parameters below as of 18 Jun 2025 05:21  Patient On (Oxygen Delivery Method): room air    PHYSICAL EXAM:  General: No apparent distress  Respiratory: Normal rate, effort  Cardiac: +S1, S2, no m/r/g  GI: +BS, soft, non tender, non distended  Extremities: L knee dressing  Neuro: A+O X3    POCT Blood Glucose.: 215 mg/dL (06-17-25 @ 21:58)  POCT Blood Glucose.: 171 mg/dL (06-17-25 @ 17:14)  POCT Blood Glucose.: 192 mg/dL (06-17-25 @ 10:12)

## 2025-06-18 NOTE — PROGRESS NOTE ADULT - ASSESSMENT
74 yr F Hx of Hypothyroidism, HLD, DM Type 1, IDDM ( dexcom pump), DVT(s/p left knee surgery 2024, off eliquis, on aspirin), anemia, GERD, skin ca s/p excision, presents with pain and decreased ROM in left knee. Stated she had a fall in 2024 with patella fracture, had ORIF and nail placement. She has had multiple surgeries to the left knee, she came in here for elective complex left knee total replacement on 6/17/25 s/p procedure.     Plan:      Will continue to monitor for post op hypotension s/p IV bolus   will monitor for post op blood loss   will manage pain and monitor for side effects, added Tramadol prn.  will try managing pain without Narcotics if patient require narcotics will monitor for sedation.        Hypotension with nausea: Patient received 500mL IV fluid bolus.  Will monitor BP.  Check Orthostatics.      DM: FS monitoring, she has been started on her insulin Pump, seen by endocrine team; Blood sugars noted to be 200s per patient.    GERD: will continue with famotidine 20 mg once a day    Hx of Hypothyroidism: Will continue with Synthroid 88 mcg once a day    Hx of HLD: will continue with pravastatin 10 mg once a day    Hx of Osteoporosis: On Prolia, injection every 6 months      Left Knee OA s/p TKR post op day 1:     - post op no complications  - VS reviewed  - abx per ortho   - c/w IVF x 24 hrs then reassess per ortho, given additional 500mL bolus given hypotension this am.  - opiate induced constipation regimen   - encouraging incentive spirometry   -c/w local wound care per ortho   -DVT prophylaxis and Pain meds as per Ortho team   -PT/OT and weight bearing per ortho       Patient is at high risk for DVT given previous Hx of DVT after knee surgery, would give Lovenox sc or Eliquis for prophylaxis.  Discussed with Ortho.  74 yr F Hx of Hypothyroidism, HLD, DM Type 1, IDDM ( dexcom pump), DVT(s/p left knee surgery 2024, off eliquis, on aspirin), anemia, GERD, skin ca s/p excision, presents with pain and decreased ROM in left knee. Stated she had a fall in 2024 with patella fracture, had ORIF and nail placement. She has had multiple surgeries to the left knee, she came in here for elective complex left knee total replacement on 6/17/25 s/p procedure.     Plan:     Will continue to monitor for hypotension given patient require pain meds   No visible blood loss, will monitor for post op blood loss   will try managing pain without Narcotics if patient require narcotics will monitor for sedation.       Hypotension with nausea: Patient received 500mL IV fluid bolus.  Will monitor BP.  Check Orthostatics.      DM: FS monitoring, she has been started on her insulin Pump, seen by endocrine team; Blood sugars noted to be 200s per patient.    GERD: will continue with famotidine 20 mg once a day    Hx of Hypothyroidism: Will continue with Synthroid 88 mcg once a day    Hx of HLD: will continue with pravastatin 10 mg once a day    Hx of Osteoporosis: On Prolia, injection every 6 months      Left Knee OA s/p TKR post op day 1:     - post op no complications  - VS reviewed  - abx per ortho   - encouraging incentive spirometry   -c/w local wound care per ortho   -DVT prophylaxis and Pain meds as per Ortho team   -PT/OT and weight bearing per ortho     Patient is at high risk for DVT given previous Hx of DVT after knee surgery, would give Lovenox sc or Eliquis for prophylaxis, discussed with Ortho.

## 2025-06-18 NOTE — PROGRESS NOTE ADULT - SUBJECTIVE AND OBJECTIVE BOX
CC: Left tka (18 Jun 2025 08:02)    HPI:  74 yr F Hx of Hypothyroidism, HLD, DM Type 1, IDDM ( dexcom pump), DVT(s/p left knee surgery 2024, off eliquis, on aspirin), anemia, GERD, skin ca s/p excision, presents with pain and decreased ROM in left knee. Stated she had a fall in 2024 with patella fracture, had ORIF and nail placement. She has had multiple surgeries to the left knee, she came in here for elective complex left knee total replacement on 6/17/25 s/p procedure.     INTERVAL HPI/OVERNIGHT EVENTS: Patient seen and examined lying in bed.  Patient with hypotension and nausea this am when working with PT..  Patient reported feeling better after receiving IVF bolus.  Patient complaining of left knee pain.  Patient denies any headache, dizziness, SOB, CP, abdominal pain, vomiting, dysuria.  Blood sugars noted to be 200s as per patient.  Other ROS reviewed and are negative.    Vital Signs Last 24 Hrs  T(C): 36.7 (18 Jun 2025 08:38), Max: 36.7 (17 Jun 2025 10:15)  T(F): 98.1 (18 Jun 2025 08:38), Max: 98.1 (18 Jun 2025 08:38)  HR: 68 (18 Jun 2025 09:05) (53 - 83)  BP: 121/72 (18 Jun 2025 09:05) (77/44 - 136/72)  BP(mean): 82 (17 Jun 2025 11:30) (77 - 93)  RR: 17 (18 Jun 2025 08:38) (12 - 19)  SpO2: 92% (18 Jun 2025 08:38) (92% - 98%)    Parameters below as of 18 Jun 2025 08:38  Patient On (Oxygen Delivery Method): room air      I&O's Detail    17 Jun 2025 07:01  -  18 Jun 2025 07:00  --------------------------------------------------------  IN:    sodium chloride 0.9%: 1500 mL  Total IN: 1500 mL    OUT:    Voided (mL): 2200 mL  Total OUT: 2200 mL    Total NET: -700 mL      PHYSICAL EXAM:  GENERAL: NAD  HEAD:  Atraumatic, Normocephalic  NECK: Supple, No JVD, Normal thyroid  NERVOUS SYSTEM:  Alert & Oriented X3, Good concentration; Motor Strength 5/5 B/L upper and lower extremities  CHEST/LUNG: Clear to auscultation bilaterally  HEART: Regular rate and rhythm; No murmurs, rubs, or gallops  ABDOMEN: Soft, Nontender, Nondistended; Bowel sounds present  EXTREMITIES:  2+ Peripheral Pulses, Left knee with minimal drainage noted on dressing  SKIN: No rashes or lesions      CAPILLARY BLOOD GLUCOSE  POCT Blood Glucose.: 215 mg/dL (17 Jun 2025 21:58)  POCT Blood Glucose.: 171 mg/dL (17 Jun 2025 17:14)  POCT Blood Glucose.: 192 mg/dL (17 Jun 2025 10:12)          MEDICATIONS  (STANDING):  acetaminophen     Tablet .. 975 milliGRAM(s) Oral every 8 hours  aspirin enteric coated 81 milliGRAM(s) Oral every 12 hours  ceFAZolin  Injectable. 2000 milliGRAM(s) IV Push <User Schedule>  dextrose 5%. 1000 milliLiter(s) (50 mL/Hr) IV Continuous <Continuous>  dextrose 5%. 1000 milliLiter(s) (100 mL/Hr) IV Continuous <Continuous>  dextrose 50% Injectable 25 Gram(s) IV Push once  dextrose 50% Injectable 12.5 Gram(s) IV Push once  dextrose 50% Injectable 25 Gram(s) IV Push once  famotidine    Tablet 20 milliGRAM(s) Oral daily  glucagon  Injectable 1 milliGRAM(s) IntraMuscular once  insulin aspart (NovoLOG) Pump 1 Each SubCutaneous Continuous Pump  levothyroxine 88 MICROGram(s) Oral daily  multivitamin 1 Tablet(s) Oral daily  polyethylene glycol 3350 17 Gram(s) Oral at bedtime  senna 2 Tablet(s) Oral at bedtime  sodium chloride 0.9%. 1000 milliLiter(s) (75 mL/Hr) IV Continuous <Continuous>    MEDICATIONS  (PRN):  dextrose Oral Gel 15 Gram(s) Oral once PRN Blood Glucose LESS THAN 70 milliGRAM(s)/deciliter  magnesium hydroxide Suspension 30 milliLiter(s) Oral daily PRN Constipation  ondansetron Injectable 4 milliGRAM(s) IV Push every 6 hours PRN Nausea and/or Vomiting  oxyCODONE    IR 5 milliGRAM(s) Oral every 3 hours PRN Moderate Pain (4 - 6)  oxyCODONE    IR 10 milliGRAM(s) Oral every 3 hours PRN Severe Pain (7 - 10)  traMADol 50 milliGRAM(s) Oral every 4 hours PRN Mild Pain (1 - 3)      RADIOLOGY & ADDITIONAL TESTS:  < from: Xray Knee 1 or 2 Views, Left (06.17.25 @ 10:01) >  ACC: [id number] EXAM:  XR KNEE 1-2 VIEWS LT   ORDERED BY: BRANDY BARGER     PROCEDURE DATE:  06/17/2025          INTERPRETATION:  Left knee    HISTORY: Postop     2 views of the left knee show total knee replacement hardware. There   also is an intramedullary destin of the lower femur which crosses an old   fracture of the femoral shaft.    IMPRESSION: Postoperative changes.      Thank you for this referral.    --- End of Report ---            LINDA GRANADOS MD; Attending Interventional Radiologist  This document has been electronically signed. Jun 18 2025  8:07AM    < end of copied text >   CC: Left tka (18 Jun 2025 08:02)    HPI:  74 yr F Hx of Hypothyroidism, HLD, DM Type 1, IDDM ( dexcom pump), DVT(s/p left knee surgery 2024, off eliquis, on aspirin), anemia, GERD, skin ca s/p excision, presents with pain and decreased ROM in left knee. Stated she had a fall in 2024 with patella fracture, had ORIF and nail placement. She has had multiple surgeries to the left knee, she came in here for elective complex left knee total replacement on 6/17/25 s/p procedure.     INTERVAL HPI/OVERNIGHT EVENTS: Patient seen and examined lying in bed.  Patient with hypotension and nausea this am when working with PT..  Patient reported feeling better after receiving IVF bolus.  Patient complaining of left knee pain.  Patient denies any headache, dizziness, SOB, CP, abdominal pain, vomiting, dysuria.  Blood sugars noted to be 200s as per patient.  Other ROS reviewed and are negative.    Vital Signs Last 24 Hrs  T(C): 36.7 (18 Jun 2025 08:38), Max: 36.7 (17 Jun 2025 10:15)  T(F): 98.1 (18 Jun 2025 08:38), Max: 98.1 (18 Jun 2025 08:38)  HR: 68 (18 Jun 2025 09:05) (53 - 83)  BP: 121/72 (18 Jun 2025 09:05) (77/44 - 136/72)  BP(mean): 82 (17 Jun 2025 11:30) (77 - 93)  RR: 17 (18 Jun 2025 08:38) (12 - 19)  SpO2: 92% (18 Jun 2025 08:38) (92% - 98%)    Parameters below as of 18 Jun 2025 08:38  Patient On (Oxygen Delivery Method): room air      I&O's Detail    17 Jun 2025 07:01  -  18 Jun 2025 07:00  --------------------------------------------------------  IN:    sodium chloride 0.9%: 1500 mL  Total IN: 1500 mL    OUT:    Voided (mL): 2200 mL  Total OUT: 2200 mL    Total NET: -700 mL      PHYSICAL EXAM:  GENERAL: NAD  HEAD:  Atraumatic, Normocephalic  NECK: Supple, No JVD, Normal thyroid  NERVOUS SYSTEM:  Alert & Oriented X3  CHEST/LUNG: Clear to auscultation bilaterally  HEART: Regular rate and rhythm; No murmurs  ABDOMEN: Soft, Nontender, Nondistended; Bowel sounds present  EXTREMITIES:  1+ Peripheral Pulses, Left knee with minimal drainage noted on dressing  SKIN: No rashes or lesions      CAPILLARY BLOOD GLUCOSE  POCT Blood Glucose.: 215 mg/dL (17 Jun 2025 21:58)  POCT Blood Glucose.: 171 mg/dL (17 Jun 2025 17:14)  POCT Blood Glucose.: 192 mg/dL (17 Jun 2025 10:12)          MEDICATIONS  (STANDING):  acetaminophen     Tablet .. 975 milliGRAM(s) Oral every 8 hours  aspirin enteric coated 81 milliGRAM(s) Oral every 12 hours  ceFAZolin  Injectable. 2000 milliGRAM(s) IV Push <User Schedule>  dextrose 5%. 1000 milliLiter(s) (50 mL/Hr) IV Continuous <Continuous>  dextrose 5%. 1000 milliLiter(s) (100 mL/Hr) IV Continuous <Continuous>  dextrose 50% Injectable 25 Gram(s) IV Push once  dextrose 50% Injectable 12.5 Gram(s) IV Push once  dextrose 50% Injectable 25 Gram(s) IV Push once  famotidine    Tablet 20 milliGRAM(s) Oral daily  glucagon  Injectable 1 milliGRAM(s) IntraMuscular once  insulin aspart (NovoLOG) Pump 1 Each SubCutaneous Continuous Pump  levothyroxine 88 MICROGram(s) Oral daily  multivitamin 1 Tablet(s) Oral daily  polyethylene glycol 3350 17 Gram(s) Oral at bedtime  senna 2 Tablet(s) Oral at bedtime  sodium chloride 0.9%. 1000 milliLiter(s) (75 mL/Hr) IV Continuous <Continuous>    MEDICATIONS  (PRN):  dextrose Oral Gel 15 Gram(s) Oral once PRN Blood Glucose LESS THAN 70 milliGRAM(s)/deciliter  magnesium hydroxide Suspension 30 milliLiter(s) Oral daily PRN Constipation  ondansetron Injectable 4 milliGRAM(s) IV Push every 6 hours PRN Nausea and/or Vomiting  oxyCODONE    IR 5 milliGRAM(s) Oral every 3 hours PRN Moderate Pain (4 - 6)  oxyCODONE    IR 10 milliGRAM(s) Oral every 3 hours PRN Severe Pain (7 - 10)  traMADol 50 milliGRAM(s) Oral every 4 hours PRN Mild Pain (1 - 3)      RADIOLOGY & ADDITIONAL TESTS:  < from: Xray Knee 1 or 2 Views, Left (06.17.25 @ 10:01) >  ACC: [id number] EXAM:  XR KNEE 1-2 VIEWS LT   ORDERED BY: BRANDY BARGER     PROCEDURE DATE:  06/17/2025          INTERPRETATION:  Left knee    HISTORY: Postop     2 views of the left knee show total knee replacement hardware. There   also is an intramedullary destin of the lower femur which crosses an old   fracture of the femoral shaft.    IMPRESSION: Postoperative changes.      Thank you for this referral.    --- End of Report ---            LINDA GRANADOS MD; Attending Interventional Radiologist  This document has been electronically signed. Jun 18 2025  8:07AM    < end of copied text >

## 2025-06-19 NOTE — PROVIDER CONTACT NOTE (OTHER) - SITUATION
Attended joint education session on 4/22/2025 via in person method with opportunity to ask questions. Patient has good understanding of content. Contact information for follow up questions given.

## 2025-06-23 RX ORDER — TRAMADOL HYDROCHLORIDE 50 MG/1
50 TABLET, COATED ORAL
Qty: 28 | Refills: 0 | Status: ACTIVE | COMMUNITY
Start: 2025-06-23 | End: 1900-01-01

## 2025-06-23 RX ORDER — NEOMYCIN/POLYMYXIN B/PRAMOXINE 3.5-10K-1
8.6-5 CREAM (GRAM) TOPICAL
Qty: 40 | Refills: 0 | Status: ACTIVE | COMMUNITY
Start: 2025-06-23 | End: 1900-01-01

## 2025-06-23 RX ORDER — CYCLOBENZAPRINE HYDROCHLORIDE 5 MG/1
5 TABLET, FILM COATED ORAL 3 TIMES DAILY
Qty: 20 | Refills: 0 | Status: ACTIVE | COMMUNITY
Start: 2025-06-19 | End: 1900-01-01

## 2025-06-24 ENCOUNTER — NON-APPOINTMENT (OUTPATIENT)
Age: 75
End: 2025-06-24

## 2025-06-30 ENCOUNTER — RX RENEWAL (OUTPATIENT)
Age: 75
End: 2025-06-30

## 2025-07-08 ENCOUNTER — APPOINTMENT (OUTPATIENT)
Dept: ORTHOPEDIC SURGERY | Facility: CLINIC | Age: 75
End: 2025-07-08

## 2025-07-14 ENCOUNTER — APPOINTMENT (OUTPATIENT)
Dept: ORTHOPEDIC SURGERY | Facility: CLINIC | Age: 75
End: 2025-07-14
Payer: MEDICARE

## 2025-07-14 PROCEDURE — 99024 POSTOP FOLLOW-UP VISIT: CPT

## 2025-07-14 PROCEDURE — 73562 X-RAY EXAM OF KNEE 3: CPT | Mod: LT

## 2025-07-22 ENCOUNTER — NON-APPOINTMENT (OUTPATIENT)
Age: 75
End: 2025-07-22

## 2025-08-18 ENCOUNTER — APPOINTMENT (OUTPATIENT)
Dept: ORTHOPEDIC SURGERY | Facility: CLINIC | Age: 75
End: 2025-08-18
Payer: MEDICARE

## 2025-08-18 DIAGNOSIS — Z96.652 AFTERCARE FOLLOWING JOINT REPLACEMENT SURGERY: ICD-10-CM

## 2025-08-18 DIAGNOSIS — Z47.1 AFTERCARE FOLLOWING JOINT REPLACEMENT SURGERY: ICD-10-CM

## 2025-08-18 DIAGNOSIS — Z96.652 PRESENCE OF LEFT ARTIFICIAL KNEE JOINT: ICD-10-CM

## 2025-08-18 PROCEDURE — 73562 X-RAY EXAM OF KNEE 3: CPT | Mod: 26,LT

## 2025-08-18 PROCEDURE — 99024 POSTOP FOLLOW-UP VISIT: CPT

## 2025-08-18 RX ORDER — CEPHALEXIN 500 MG/1
500 TABLET ORAL
Qty: 28 | Refills: 0 | Status: ACTIVE | COMMUNITY
Start: 2025-08-18 | End: 1900-01-01

## 2025-08-25 ENCOUNTER — OFFICE (OUTPATIENT)
Dept: URBAN - METROPOLITAN AREA CLINIC 113 | Facility: CLINIC | Age: 75
Setting detail: OPHTHALMOLOGY
End: 2025-08-25
Payer: MEDICARE

## 2025-08-25 VITALS — HEIGHT: 55 IN

## 2025-08-25 DIAGNOSIS — H40.013: ICD-10-CM

## 2025-08-25 PROCEDURE — 92020 GONIOSCOPY: CPT | Performed by: STUDENT IN AN ORGANIZED HEALTH CARE EDUCATION/TRAINING PROGRAM

## 2025-08-25 PROCEDURE — 76514 ECHO EXAM OF EYE THICKNESS: CPT | Performed by: STUDENT IN AN ORGANIZED HEALTH CARE EDUCATION/TRAINING PROGRAM

## 2025-08-25 PROCEDURE — 92083 EXTENDED VISUAL FIELD XM: CPT | Performed by: STUDENT IN AN ORGANIZED HEALTH CARE EDUCATION/TRAINING PROGRAM

## 2025-08-25 PROCEDURE — 99213 OFFICE O/P EST LOW 20 MIN: CPT | Performed by: STUDENT IN AN ORGANIZED HEALTH CARE EDUCATION/TRAINING PROGRAM

## 2025-08-25 PROCEDURE — 92133 CPTRZD OPH DX IMG PST SGM ON: CPT | Performed by: STUDENT IN AN ORGANIZED HEALTH CARE EDUCATION/TRAINING PROGRAM

## 2025-08-25 ASSESSMENT — VISUAL ACUITY
OD_BCVA: 20/20-2
OS_BCVA: 20/100+

## 2025-08-25 ASSESSMENT — REFRACTION_MANIFEST
OS_VA1: 20/25-
OD_ADD: +2.50
OU_VA: 20/25-2
OD_VA1: 20/30
OD_AXIS: 060
OU_VA: 20/25-2
OD_VA2: 20/20(J1+)
OD_VA1: 20/30
OS_VA2: 20/20(J1+)
OS_SPHERE: -0.50
OS_CYLINDER: -1.00
OS_VA1: 20/25-
OS_SPHERE: -0.75
OS_AXIS: 125
OD_VA2: 20/20(J1+)
OD_SPHERE: -1.00
OS_AXIS: 125
OD_ADD: +2.50
OD_CYLINDER: -0.75
OD_CYLINDER: -0.75
OD_AXIS: 060
OS_VA2: 20/20(J1+)
OS_CYLINDER: -1.00
OD_SPHERE: -1.25
OS_ADD: +2.50
OS_ADD: +2.50

## 2025-08-25 ASSESSMENT — TONOMETRY
OS_IOP_MMHG: 16
OD_IOP_MMHG: 13

## 2025-08-25 ASSESSMENT — PACHYMETRY
OD_CT_CORRECTION: 1
OS_CT_UM: 551
OS_CT_CORRECTION: -1
OD_CT_UM: 532

## 2025-08-25 ASSESSMENT — REFRACTION_AUTOREFRACTION
OD_AXIS: 075
OD_CYLINDER: -1.00
OS_CYLINDER: -1.00
OS_AXIS: 117
OS_SPHERE: -1.00
OD_SPHERE: -1.25

## 2025-08-25 ASSESSMENT — REFRACTION_CURRENTRX
OS_CYLINDER: SPH
OS_OVR_VA: 20/
OD_VPRISM_DIRECTION: SV
OD_OVR_VA: 20/
OS_VPRISM_DIRECTION: SV
OS_SPHERE: +2.00
OD_SPHERE: +2.00
OD_CYLINDER: SPH

## 2025-08-25 ASSESSMENT — CONFRONTATIONAL VISUAL FIELD TEST (CVF)
OS_FINDINGS: FULL
OD_FINDINGS: FULL

## 2025-08-25 ASSESSMENT — TEAR BREAK UP TIME (TBUT)
OD_TBUT: 6-8 SECS
OS_TBUT: 6-8 SECS

## 2025-08-25 ASSESSMENT — KERATOMETRY
OS_K1POWER_DIOPTERS: 45.00
OD_AXISANGLE_DEGREES: 153
OS_K2POWER_DIOPTERS: 45.75
METHOD_AUTO_MANUAL: AUTO
OD_K2POWER_DIOPTERS: 46.00
OS_AXISANGLE_DEGREES: 021
OD_K1POWER_DIOPTERS: 45.50

## 2025-08-25 ASSESSMENT — SUPERFICIAL PUNCTATE KERATITIS (SPK)
OD_SPK: 1+
OS_SPK: 1+

## 2025-09-17 ENCOUNTER — APPOINTMENT (OUTPATIENT)
Dept: ORTHOPEDIC SURGERY | Facility: CLINIC | Age: 75
End: 2025-09-17

## 2025-09-17 VITALS — WEIGHT: 147 LBS | HEIGHT: 63 IN | BODY MASS INDEX: 26.05 KG/M2

## 2025-09-17 DIAGNOSIS — Z96.652 AFTERCARE FOLLOWING JOINT REPLACEMENT SURGERY: ICD-10-CM

## 2025-09-17 DIAGNOSIS — Z47.1 AFTERCARE FOLLOWING JOINT REPLACEMENT SURGERY: ICD-10-CM

## (undated) DEVICE — WOUND IRR SURGIPHOR

## (undated) DEVICE — DRAPE U LONG (CLEAR) 47 X 70"

## (undated) DEVICE — DRSG MEPILEX 10 X 25CM (4 X 10") POST OP AG SILVER

## (undated) DEVICE — SUT VICRYL 2-0 27" CT-2 UNDYED

## (undated) DEVICE — SYR LUER LOK 50CC

## (undated) DEVICE — DRSG DERMABOND PRINEO 60CM

## (undated) DEVICE — SOL IRR POUR NS 0.9% 1000ML

## (undated) DEVICE — DRAPE 3/4 SHEET 52X76"

## (undated) DEVICE — WARMING BLANKET UPPER ADULT

## (undated) DEVICE — ZIMMER BLADE PATELLA REAMER W PILOT HOLE SZ 32

## (undated) DEVICE — GLV 8 PROTEXIS ORTHO (BROWN)

## (undated) DEVICE — SOL IRR BAG NS 0.9% 3000ML

## (undated) DEVICE — ZIMMER MIXING BOWL

## (undated) DEVICE — VENODYNE/SCD SLEEVE CALF MEDIUM

## (undated) DEVICE — HOOD FLYTE STRYKER SURGICOOL W PEELAWAY

## (undated) DEVICE — ORTHOALIGN PLUS UNIT

## (undated) DEVICE — SUT MONOCRYL 3-0 27" PS-2 UNDYED

## (undated) DEVICE — NDL HYPO SAFE 20G X 1.5" (YELLOW)

## (undated) DEVICE — ZIMMER PULSAVAC PLUS FAN KIT

## (undated) DEVICE — ELCTR STRYKER NEPTUNE SMOKE EVACUATION PENCIL (GREEN)

## (undated) DEVICE — SUT VICRYL PLUS 0 18" CT-1 UNDYED (POP-OFF)

## (undated) DEVICE — GLV 8 PROTEXIS (BLUE)

## (undated) DEVICE — SAW BLADE STRYKER OSCILLATING 18.5MMX1.24MMX104.0MM

## (undated) DEVICE — TAPE SILK 3"

## (undated) DEVICE — DRAPE 1/2 SHEET 40X57"

## (undated) DEVICE — PACK TOTAL KNEE

## (undated) DEVICE — ZIMMER BLADE PATELLA REAMER SIZE 29

## (undated) DEVICE — DRAPE XL SHEET 77X98"

## (undated) DEVICE — SOL IRR POUR H2O 1000ML